# Patient Record
Sex: MALE | Race: WHITE | Employment: OTHER | URBAN - METROPOLITAN AREA
[De-identification: names, ages, dates, MRNs, and addresses within clinical notes are randomized per-mention and may not be internally consistent; named-entity substitution may affect disease eponyms.]

---

## 2022-05-18 ENCOUNTER — APPOINTMENT (OUTPATIENT)
Dept: GENERAL RADIOLOGY | Age: 52
End: 2022-05-18
Attending: EMERGENCY MEDICINE
Payer: MEDICARE

## 2022-05-18 ENCOUNTER — HOSPITAL ENCOUNTER (EMERGENCY)
Age: 52
Discharge: HOME | End: 2022-05-18
Attending: EMERGENCY MEDICINE
Payer: MEDICARE

## 2022-05-18 VITALS
SYSTOLIC BLOOD PRESSURE: 121 MMHG | OXYGEN SATURATION: 96 % | DIASTOLIC BLOOD PRESSURE: 70 MMHG | TEMPERATURE: 98.4 F | HEART RATE: 71 BPM | HEIGHT: 70 IN | RESPIRATION RATE: 16 BRPM | BODY MASS INDEX: 25.62 KG/M2 | WEIGHT: 179 LBS

## 2022-05-18 DIAGNOSIS — M25.562 ACUTE PAIN OF LEFT KNEE: Primary | ICD-10-CM

## 2022-05-18 PROCEDURE — 73562 X-RAY EXAM OF KNEE 3: CPT

## 2022-05-18 PROCEDURE — 74011250637 HC RX REV CODE- 250/637: Performed by: EMERGENCY MEDICINE

## 2022-05-18 PROCEDURE — 99283 EMERGENCY DEPT VISIT LOW MDM: CPT

## 2022-05-18 RX ORDER — IBUPROFEN 800 MG/1
800 TABLET ORAL
Status: COMPLETED | OUTPATIENT
Start: 2022-05-18 | End: 2022-05-18

## 2022-05-18 RX ADMIN — IBUPROFEN 800 MG: 800 TABLET, FILM COATED ORAL at 20:46

## 2022-05-18 NOTE — ED PROVIDER NOTES
58-year-old male with history of type 1 diabetes mellitus presents status post mechanical trip and fall yesterday while at work. States he landed on left knee. Reports mild pain to left knee. Denies significant swelling, color change. Denies history of remote arthritis, gout. Denies hitting head, loss conscious. Denies any anticoagulation. Rates pain as mild to moderate. Denies radiation of pain. Was attempting to take anything for pain prior to arrival.  Denies chest pain, shortness of breath, numbness, tingling, weakness. States he has been compliant with all of his home medications. States that sugars have been running in the 100s to low 200s. The history is provided by the patient. No  was used. Knee Injury   Pertinent negatives include no back pain and no neck pain. No past medical history on file. No past surgical history on file. No family history on file. Social History     Socioeconomic History    Marital status: LEGALLY      Spouse name: Not on file    Number of children: Not on file    Years of education: Not on file    Highest education level: Not on file   Occupational History    Not on file   Tobacco Use    Smoking status: Not on file    Smokeless tobacco: Not on file   Substance and Sexual Activity    Alcohol use: Not on file    Drug use: Not on file    Sexual activity: Not on file   Other Topics Concern    Not on file   Social History Narrative    Not on file     Social Determinants of Health     Financial Resource Strain:     Difficulty of Paying Living Expenses: Not on file   Food Insecurity:     Worried About Running Out of Food in the Last Year: Not on file    Juan of Food in the Last Year: Not on file   Transportation Needs:     Lack of Transportation (Medical): Not on file    Lack of Transportation (Non-Medical):  Not on file   Physical Activity:     Days of Exercise per Week: Not on file    Minutes of Exercise per Session: Not on file   Stress:     Feeling of Stress : Not on file   Social Connections:     Frequency of Communication with Friends and Family: Not on file    Frequency of Social Gatherings with Friends and Family: Not on file    Attends Hindu Services: Not on file    Active Member of Clubs or Organizations: Not on file    Attends Club or Organization Meetings: Not on file    Marital Status: Not on file   Intimate Partner Violence:     Fear of Current or Ex-Partner: Not on file    Emotionally Abused: Not on file    Physically Abused: Not on file    Sexually Abused: Not on file   Housing Stability:     Unable to Pay for Housing in the Last Year: Not on file    Number of [place] in the Last Year: Not on file    Unstable Housing in the Last Year: Not on file         ALLERGIES: Patient has no known allergies. Review of Systems   Constitutional: Negative for diaphoresis, fatigue and fever. HENT: Negative for congestion and rhinorrhea. Respiratory: Negative for cough and shortness of breath. Cardiovascular: Negative for chest pain and palpitations. Gastrointestinal: Negative for abdominal pain, nausea and vomiting. Musculoskeletal: Positive for arthralgias. Negative for back pain, neck pain and neck stiffness. Skin: Negative for color change and rash. Neurological: Negative for dizziness, syncope, weakness, light-headedness and headaches. Hematological: Does not bruise/bleed easily. Psychiatric/Behavioral: Negative for confusion. Vitals:    05/18/22 1917   BP: 136/67   Pulse: 74   Resp: 18   Temp: 98.2 °F (36.8 °C)   SpO2: 98%   Weight: 81.2 kg (179 lb)   Height: 5' 10\" (1.778 m)            Physical Exam  Vitals and nursing note reviewed. Constitutional:       Appearance: Normal appearance. HENT:      Head: Normocephalic.       Nose: Nose normal.      Mouth/Throat:      Mouth: Mucous membranes are moist.   Eyes:      Extraocular Movements: Extraocular movements intact. Pupils: Pupils are equal, round, and reactive to light. Cardiovascular:      Rate and Rhythm: Normal rate. Pulses: Normal pulses. Heart sounds: Normal heart sounds. Pulmonary:      Effort: Pulmonary effort is normal.      Breath sounds: Normal breath sounds. Abdominal:      General: Bowel sounds are normal.      Palpations: Abdomen is soft. Tenderness: There is no abdominal tenderness. There is no guarding or rebound. Musculoskeletal:         General: Tenderness present. Right knee: Normal. No swelling, deformity, effusion or erythema. Left knee: No swelling, deformity, effusion, erythema, lacerations, bony tenderness or crepitus. Tenderness present. Normal alignment. Right lower leg: No edema. Left lower leg: No edema. Comments: Mild tenderness noted to the left knee. No crepitus. No joint laxity. DP pulses 2+ and equal bilaterally. NVID. Cap refill <3 sec. Skin:     Findings: No erythema or rash. Neurological:      General: No focal deficit present. Mental Status: He is alert and oriented to person, place, and time. Cranial Nerves: No cranial nerve deficit. Sensory: No sensory deficit. Motor: No weakness. MDM  Number of Diagnoses or Management Options  Acute pain of left knee: new and requires workup  Diagnosis management comments: Vital signs stable. X-ray with no acute abnormality. Patient with no joint laxity. Ibuprofen, ice bag given to patient. Will place in knee immobilizer and have follow-up with primary care physician, orthopedic surgery.        Amount and/or Complexity of Data Reviewed  Tests in the radiology section of CPT®: ordered and reviewed  Review and summarize past medical records: yes  Independent visualization of images, tracings, or specimens: yes    Risk of Complications, Morbidity, and/or Mortality  Presenting problems: moderate  Diagnostic procedures: moderate  Management options: moderate    Patient Progress  Patient progress: stable         Procedures          XR KNEE LT 3 V   Final Result   1. No acute abnormality                     Nino Coleman MD; 5/18/2022 @7:44 PM Voice dictation software was used during the making of this note. This software is not perfect and grammatical and other typographical errors may be present.   This note has not been proofread for errors.  ===================================================================

## 2022-05-18 NOTE — ED TRIAGE NOTES
Arrives with face mask in place. Ambulatory with difficulty into triage. Reports left knee injury due to fall yesterday. Reportedly lost balance while working causing fall. Denies hitting head or loss of consciousness. Denies blood thinners. Denies attempting pain meds pta.

## 2022-05-19 NOTE — DISCHARGE INSTRUCTIONS
Rest, ice, elevate, compression bandage. NSAIDs for pain control. Schedule close follow-up with primary care physician, orthopedic surgery. Return if symptoms worsen or progress in any way.

## 2022-05-19 NOTE — ED NOTES
I have reviewed discharge instructions with the patient. The patient verbalized understanding. Patient left ED via Discharge Method: ambulatory to Home with friend. Opportunity for questions and clarification provided. Patient given 0 scripts. To continue your aftercare when you leave the hospital, you may receive an automated call from our care team to check in on how you are doing. This is a free service and part of our promise to provide the best care and service to meet your aftercare needs.  If you have questions, or wish to unsubscribe from this service please call [phone]. Thank you for Choosing our [facility] Emergency Department.

## 2022-06-15 ENCOUNTER — OFFICE VISIT (OUTPATIENT)
Dept: ORTHOPEDIC SURGERY | Age: 52
End: 2022-06-15
Payer: MEDICARE

## 2022-06-15 DIAGNOSIS — M17.12 PRIMARY OSTEOARTHRITIS OF LEFT KNEE: Primary | ICD-10-CM

## 2022-06-15 PROCEDURE — 4004F PT TOBACCO SCREEN RCVD TLK: CPT | Performed by: PHYSICIAN ASSISTANT

## 2022-06-15 PROCEDURE — 99202 OFFICE O/P NEW SF 15 MIN: CPT | Performed by: PHYSICIAN ASSISTANT

## 2022-06-15 PROCEDURE — G8419 CALC BMI OUT NRM PARAM NOF/U: HCPCS | Performed by: PHYSICIAN ASSISTANT

## 2022-06-15 PROCEDURE — G8428 CUR MEDS NOT DOCUMENT: HCPCS | Performed by: PHYSICIAN ASSISTANT

## 2022-06-15 PROCEDURE — 3017F COLORECTAL CA SCREEN DOC REV: CPT | Performed by: PHYSICIAN ASSISTANT

## 2022-06-15 PROCEDURE — 20610 DRAIN/INJ JOINT/BURSA W/O US: CPT | Performed by: PHYSICIAN ASSISTANT

## 2022-06-15 RX ORDER — METHYLPREDNISOLONE ACETATE 80 MG/ML
80 INJECTION, SUSPENSION INTRA-ARTICULAR; INTRALESIONAL; INTRAMUSCULAR; SOFT TISSUE ONCE
Status: COMPLETED | OUTPATIENT
Start: 2022-06-15 | End: 2022-06-15

## 2022-06-15 RX ADMIN — METHYLPREDNISOLONE ACETATE 80 MG: 80 INJECTION, SUSPENSION INTRA-ARTICULAR; INTRALESIONAL; INTRAMUSCULAR; SOFT TISSUE at 11:41

## 2022-06-15 NOTE — PROGRESS NOTES
[facility]          Patient ID:  Name: Migel Maria  AGE/Gender: 45 y.o. male  MRN: [id number]  : 1970    Date of Consultation:  Paola 15, 2022          ALLERGIES: Not on File         History:  The patient presents today as a new patient complaining primarily of   Left knee pain. This has been going on for a couple of months but has gradually gotten worse. He went to the ER last month and got x-rays. They placed him on motrin that helped some. They describe the pain as a general ache with periods of sharp pains. They have pain putting on his shoes and socks. They rate the pain as a 4 out of 10. The patient is limited in their activities because of this pain. They deny any injuries. They have no other complaints or concerns. Review of Systems:  A comprehensive review of systems was negative. Past Medical History Includes: No past medical history on file., No past surgical history on file. Family History: No family history on file. Social History:   Social History     Tobacco Use    Smoking status: Not on file    Smokeless tobacco: Not on file   Substance Use Topics    Alcohol use: Not on file         Physical Exam:     General:  On exam the patient is a pleasant 45 y.o. male in no acute distress, A&O x 3. Ambulates without an  antalgic gait. Back:   Knees:  No lower extremity redness, rashes or lymphadenopathy with either lower extremity. No effusion. ROM is 0-130 degrees on the Right and 0-130 degrees on the Left. No repitus noted on left Knee ROM. No instability to varus/valgus stress, no A/P instability on either extremity. Quad strength is good on both extremities. There is no extensor lag. Negative Luciano's test. Calves are soft and non-tender. Vascular: No distal edema or clubbing, Distal pulses are intact  Neurologic: Normal. Normal reflexes bilateral lower extremities.           Radiographs    X-rays reviewed on EMR that demonstrates moderate joint space narrowing the medial compartment with some eburnation of bone consistent with moderate osteoarthritis of the knee        Assessment:     Osteoarthritis of the  Left knee      Medical Decision Making and Plan:    X-rays were reviewed as well as referring provider notes  The patient has moderate arthritis of the left knee. This seems to be localized to the medial compartment. Offered him a steroid injection today which she agreed to. He is diabetic and will watch his blood sugars closely. If this helps we could try viscosupplementation as well. I also gave him samples of Pennsaid to try. We will follow-up in 4 weeks to see how he is doing or sooner if needed    Procedure: Depo-Medrol injection left knee  Procedure note: The left knee was sterilely prepped with Betadine and alcohol the left knee was then injected with 4 cc of 2% Xylocaine mixed with 80 mg of Depo-Medrol. The patient tolerated this without difficulty.   He will restrict activity for 48 hours and follow-up as scheduled or sooner if needed     Time spent in preparation, chart review, and direct patient care was 20 minutes    Electronically signed by:   LAURIE Lester, PA  6/15/2022,  11:37 AM

## 2022-07-13 ENCOUNTER — OFFICE VISIT (OUTPATIENT)
Dept: ORTHOPEDIC SURGERY | Age: 52
End: 2022-07-13
Payer: MEDICARE

## 2022-07-13 DIAGNOSIS — M17.12 PRIMARY OSTEOARTHRITIS OF LEFT KNEE: Primary | ICD-10-CM

## 2022-07-13 PROCEDURE — G8419 CALC BMI OUT NRM PARAM NOF/U: HCPCS | Performed by: PHYSICIAN ASSISTANT

## 2022-07-13 PROCEDURE — 4004F PT TOBACCO SCREEN RCVD TLK: CPT | Performed by: PHYSICIAN ASSISTANT

## 2022-07-13 PROCEDURE — G8428 CUR MEDS NOT DOCUMENT: HCPCS | Performed by: PHYSICIAN ASSISTANT

## 2022-07-13 PROCEDURE — 99212 OFFICE O/P EST SF 10 MIN: CPT | Performed by: PHYSICIAN ASSISTANT

## 2022-07-13 PROCEDURE — 3017F COLORECTAL CA SCREEN DOC REV: CPT | Performed by: PHYSICIAN ASSISTANT

## 2022-07-13 NOTE — LETTER
Joint Injection Precert Authorization Form    Name: Verna Pabon  : 1970  Age: 45 y.o. Gender: male    Clinical Information    Referring Doctor: Juanis Baker   Diagnosis:     ICD-10-CM    1. Primary osteoarthritis of left knee  M17.12    . Body Part: Left knee    Type of Service    [ ] Ford Mercer.Jose Ramon    [ ] Francesca Brandon.Salome    [ ] [facility] - Thuy.[place]    [ ] [facility]    [ X ] Darlyn Murray -       Comments      Insurance:  Active Insurance as of 2022     Patient has no active insurance coverage on file for 2022.          Radiologic evidence to support diagnosis: X-rays    Non-drug therapy: Lifestyle modifications    Pharmacologic Therapy: Steroid injection, anti-inflammatories

## 2022-07-19 ENCOUNTER — HOSPITAL ENCOUNTER (EMERGENCY)
Age: 52
Discharge: HOME | End: 2022-07-19
Attending: EMERGENCY MEDICINE
Payer: MEDICARE

## 2022-07-19 VITALS
HEIGHT: 70 IN | RESPIRATION RATE: 18 BRPM | HEART RATE: 81 BPM | DIASTOLIC BLOOD PRESSURE: 64 MMHG | TEMPERATURE: 99 F | BODY MASS INDEX: 25.62 KG/M2 | SYSTOLIC BLOOD PRESSURE: 99 MMHG | OXYGEN SATURATION: 96 % | WEIGHT: 179 LBS

## 2022-07-19 DIAGNOSIS — U07.1 COVID-19 VIRUS INFECTION: Primary | ICD-10-CM

## 2022-07-19 LAB
SARS-COV-2 RDRP RESP QL NAA+PROBE: DETECTED
SOURCE: ABNORMAL

## 2022-07-19 PROCEDURE — 99283 EMERGENCY DEPT VISIT LOW MDM: CPT

## 2022-07-19 PROCEDURE — 87635 SARS-COV-2 COVID-19 AMP PRB: CPT

## 2022-07-19 PROCEDURE — 6370000000 HC RX 637 (ALT 250 FOR IP): Performed by: EMERGENCY MEDICINE

## 2022-07-19 RX ORDER — PRAVASTATIN SODIUM 20 MG
20 TABLET ORAL DAILY
COMMUNITY
End: 2022-09-15

## 2022-07-19 RX ORDER — CITALOPRAM 10 MG/1
10 TABLET ORAL DAILY
COMMUNITY
End: 2022-09-15

## 2022-07-19 RX ORDER — ACETAMINOPHEN 500 MG
1000 TABLET ORAL
Status: COMPLETED | OUTPATIENT
Start: 2022-07-19 | End: 2022-07-19

## 2022-07-19 RX ADMIN — ACETAMINOPHEN 1000 MG: 500 TABLET, FILM COATED ORAL at 11:53

## 2022-07-19 ASSESSMENT — ENCOUNTER SYMPTOMS
GASTROINTESTINAL NEGATIVE: 1
COUGH: 1
RHINORRHEA: 1

## 2022-07-19 ASSESSMENT — PAIN - FUNCTIONAL ASSESSMENT: PAIN_FUNCTIONAL_ASSESSMENT: NONE - DENIES PAIN

## 2022-07-19 NOTE — ED PROVIDER NOTES
[facility] Emergency Department Provider Note                   PCP:                Apoorva Villavicencio MD               Age: 47 y.o. Sex: male       ICD-10-CM    1. COVID-19 virus infection  U07.1           DISPOSITION Decision To Discharge 07/19/2022 12:17:55 PM        MDM  Number of Diagnoses or Management Options  Diagnosis management comments: 48-year-old  male presented emergency department with complaints of fever and cough since yesterday. Patient tested positive for COVID-19 here. Patient is not hypoxic. Patient's vital signs are very reassuring. Patient clinically looks very good as well. Patient will be given prescription for outpatient COVID treatment. He will also take Motrin and Tylenol as needed for fevers. He will aggressively hydrate. He will return the emergency department for any concerns. Amount and/or Complexity of Data Reviewed  Clinical lab tests: reviewed    Patient Progress  Patient progress: stable       Orders Placed This Encounter   Procedures    [id number], Jey Saundersanika Cohen is a 47 y.o. male who presents to the Emergency Department with chief complaint of    Chief Complaint   Patient presents with    Cough    Fever      48-year-old  male presented to the emergency department with complaints of fever since yesterday. Patient states that he woke up this morning and he had developed a cough and sneezing. He states that he feels pretty good right now but wanted to know what was going on. Patient has not vaccinated for COVID-19. He has no known exposure to COVID-19. Patient is not febrile currently. He denies any headache, chest pain, shortness of breath, nausea, vomiting, diarrhea or any other concerns. The history is provided by the patient. Review of Systems   Constitutional:  Positive for fatigue and fever. HENT:  Positive for rhinorrhea and sneezing. Respiratory:  Positive for cough. Cardiovascular: Negative. Gastrointestinal: Negative. Genitourinary: Negative. Musculoskeletal: Negative. Skin: Negative. Neurological: Negative. Psychiatric/Behavioral: Negative. All other systems reviewed and are negative. Past Medical History:   Diagnosis Date    High cholesterol         History reviewed. No pertinent surgical history. History reviewed. No pertinent family history. Social History     Socioeconomic History    Marital status: Legally      Spouse name: None    Number of children: None    Years of education: None    Highest education level: None   Tobacco Use    Smoking status: Never    Smokeless tobacco: Never   Substance and Sexual Activity    Alcohol use: Never         Patient has no known allergies. Previous Medications    CITALOPRAM (CELEXA) 10 MG TABLET    Take 10 mg by mouth in the morning. PRAVASTATIN (PRAVACHOL) 20 MG TABLET    Take 20 mg by mouth in the morning. Vitals signs and nursing note reviewed. Patient Vitals for the past 4 hrs:   Temp Pulse Resp BP SpO2   07/19/22 1030 99 °F (37.2 °C) 81 18 111/64 98 %          Physical Exam  Vitals and nursing note reviewed. Constitutional:       General: He is not in acute distress. Appearance: Normal appearance. He is not ill-appearing or toxic-appearing. HENT:      Head: Normocephalic and atraumatic. Mouth/Throat:      Mouth: Mucous membranes are moist.   Eyes:      Extraocular Movements: Extraocular movements intact. Pupils: Pupils are equal, round, and reactive to light. Cardiovascular:      Rate and Rhythm: Normal rate and regular rhythm. Pulses: Normal pulses. Heart sounds: Normal heart sounds. Pulmonary:      Effort: Pulmonary effort is normal.      Breath sounds: Normal breath sounds. Abdominal:      Palpations: Abdomen is soft. Tenderness: There is no abdominal tenderness. There is no guarding. Musculoskeletal:         General: Normal range of motion.    Skin: General: Skin is warm and dry. Neurological:      General: No focal deficit present. Mental Status: He is alert and oriented to person, place, and time. Psychiatric:         Mood and Affect: Mood normal.         Behavior: Behavior normal.         Thought Content: Thought content normal.         Judgment: Judgment normal.              Labs Reviewed   COVID-19, RAPID - Abnormal; Notable for the following components:       Result Value    SARS-CoV-2, Rapid Detected (*)     All other components within normal limits        No orders to display                          Voice dictation software was used during the making of this note. This software is not perfect and grammatical and other typographical errors may be present. This note has not been completely proofread for errors.        Minh Carnes MD  07/19/22 2011

## 2022-07-19 NOTE — DISCHARGE INSTRUCTIONS
Take Motrin Tylenol as needed for body aches and fevers. Return to the emergency department for any concerns. Aggressively hydrate.

## 2022-07-19 NOTE — ED NOTES
I have reviewed discharge instructions with the patient. The patient verbalized understanding. Patient left ED via Discharge Method: ambulatory to Home with self. Opportunity for questions and clarification provided. Patient given 0 scripts. To continue your aftercare when you leave the hospital, you may receive an automated call from our care team to check in on how you are doing. This is a free service and part of our promise to provide the best care and service to meet your aftercare needs.  If you have questions, or wish to unsubscribe from this service please call [phone]. Thank you for Choosing our [facility] Emergency Department.         Fidelina Perkins RN  07/19/22 0887

## 2022-07-19 NOTE — Clinical Note
Yony Haas was seen and treated in our emergency department on 7/19/2022. COVID19 virus is suspected. Per the CDC guidelines we recommend home isolation until the following conditions are all met:    1. At least five days have passed since symptoms first appeared and/or had a close exposure,   2. After home isolation for five days, wearing a mask around others for the next five days,  3. At least 24 have passed since last fever without the use of fever-reducing medications and  4. Symptoms (eg cough, shortness of breath) have improved    If you have any questions or concerns, please don't hesitate to call.     He may return to work/school on 07/25/2022        Minna Page MD

## 2022-07-19 NOTE — ED TRIAGE NOTES
Pt states he has had cough since yesterday, had fever of 101.3 today, has not taken tylenol or motrin. Denies coughing anything up. Denies SOB nausea or diarrhea.

## 2022-07-20 ENCOUNTER — CARE COORDINATION (OUTPATIENT)
Dept: CARE COORDINATION | Facility: CLINIC | Age: 52
End: 2022-07-20

## 2022-07-20 NOTE — CARE COORDINATION
Date/Time:  7/20/2022 9:17 AM  Attempted to reach patient by telephone for COVID REBECCA. Call within 2 business days of discharge: Yes Left HIPPA compliant message requesting a return call. Will attempt to reach patient again.

## 2022-07-21 ENCOUNTER — CARE COORDINATION (OUTPATIENT)
Dept: CARE COORDINATION | Facility: CLINIC | Age: 52
End: 2022-07-21

## 2022-07-21 NOTE — CARE COORDINATION
Date/Time:  7/21/2022 9:11 AM  2nd attempt to reach patient by telephone for COVID REBECCA. Initial call within 2 business days of discharge: Yes Left another HIPPA compliant message requesting a return call. Episode will be resolved at this time due to no return call. Episode will resume if return call is received.

## 2022-07-27 ENCOUNTER — OFFICE VISIT (OUTPATIENT)
Dept: ORTHOPEDIC SURGERY | Age: 52
End: 2022-07-27
Payer: MEDICARE

## 2022-07-27 DIAGNOSIS — M17.12 PRIMARY OSTEOARTHRITIS OF LEFT KNEE: Primary | ICD-10-CM

## 2022-07-27 PROCEDURE — 20610 DRAIN/INJ JOINT/BURSA W/O US: CPT | Performed by: PHYSICIAN ASSISTANT

## 2022-07-27 RX ORDER — INSULIN DEGLUDEC INJECTION 100 U/ML
34 INJECTION, SOLUTION SUBCUTANEOUS EVERY MORNING
COMMUNITY
Start: 2022-07-13 | End: 2022-09-15

## 2022-07-27 RX ORDER — CEPHALEXIN 500 MG/1
CAPSULE ORAL
COMMUNITY
Start: 2022-05-09 | End: 2022-09-15

## 2022-07-27 RX ORDER — BROMPHENIRAMINE MALEATE, PSEUDOEPHEDRINE HYDROCHLORIDE, AND DEXTROMETHORPHAN HYDROBROMIDE 2; 30; 10 MG/5ML; MG/5ML; MG/5ML
5 SYRUP ORAL EVERY 4 HOURS PRN
COMMUNITY
Start: 2022-01-20 | End: 2022-09-15

## 2022-07-27 RX ORDER — ONDANSETRON 8 MG/1
8 TABLET, ORALLY DISINTEGRATING ORAL 3 TIMES DAILY PRN
COMMUNITY
Start: 2022-04-12 | End: 2022-09-15

## 2022-07-27 RX ORDER — PRAVASTATIN SODIUM 20 MG
20 TABLET ORAL DAILY
COMMUNITY
Start: 2022-06-03

## 2022-07-27 RX ORDER — ACETAMINOPHEN AND CODEINE PHOSPHATE 300; 30 MG/1; MG/1
1 TABLET ORAL EVERY 4 HOURS PRN
COMMUNITY
Start: 2021-07-27 | End: 2022-09-15

## 2022-07-27 RX ORDER — INSULIN LISPRO 100 [IU]/ML
INJECTION, SOLUTION INTRAVENOUS; SUBCUTANEOUS
COMMUNITY
Start: 2021-12-20

## 2022-07-27 RX ORDER — LISINOPRIL 5 MG/1
5 TABLET ORAL DAILY
COMMUNITY
Start: 2022-06-03 | End: 2023-06-03

## 2022-07-27 RX ORDER — CITALOPRAM 10 MG/1
10 TABLET ORAL DAILY
COMMUNITY
Start: 2022-06-03 | End: 2023-06-03

## 2022-07-27 RX ORDER — INSULIN DEGLUDEC INJECTION 100 U/ML
INJECTION, SOLUTION SUBCUTANEOUS
COMMUNITY
Start: 2022-07-13

## 2022-07-27 RX ORDER — PSEUDOEPHEDRINE HCL 30 MG
100 TABLET ORAL
COMMUNITY
Start: 2021-08-05 | End: 2022-09-15

## 2022-07-27 RX ORDER — ALBUTEROL SULFATE 90 UG/1
2 AEROSOL, METERED RESPIRATORY (INHALATION) EVERY 6 HOURS PRN
COMMUNITY
Start: 2022-01-04 | End: 2022-09-15

## 2022-07-27 NOTE — PROGRESS NOTES
[address]          Patient ID:  Name: Eleazar Liang  AGE/Gender: 47 y.o. male  MRN: [id number]  : 1970    Date of Consultation:  2022        ALLERGIES: No Known Allergies     Diagnosis: Osteoarthritis left Knee    PROCEDURE:  4ml of Monovisc  Injection of the left Knee        The procedure was explained to the patient and possible adverse reactions were discussed. The  left knee/s anteriolateral aspect was prepped and cleansed with: sterile fashion with betadine and alcohol.  The left Knee was then injected with 4ml of Monovisc     How tolerated by patient: tolerated the procedure well with no complications    Post injection instructions were given to Eleazar Liang:       Electronically signed by:   LAURIE Fraser Si, PA  2022,  11:51 AM

## 2022-09-15 ENCOUNTER — OFFICE VISIT (OUTPATIENT)
Dept: INTERNAL MEDICINE CLINIC | Facility: CLINIC | Age: 52
End: 2022-09-15
Payer: MEDICARE

## 2022-09-15 VITALS
SYSTOLIC BLOOD PRESSURE: 98 MMHG | OXYGEN SATURATION: 96 % | HEIGHT: 70 IN | RESPIRATION RATE: 18 BRPM | WEIGHT: 169 LBS | BODY MASS INDEX: 24.2 KG/M2 | HEART RATE: 65 BPM | DIASTOLIC BLOOD PRESSURE: 62 MMHG

## 2022-09-15 DIAGNOSIS — J30.2 SEASONAL ALLERGIES: ICD-10-CM

## 2022-09-15 DIAGNOSIS — F32.89 OTHER DEPRESSION: ICD-10-CM

## 2022-09-15 DIAGNOSIS — I10 PRIMARY HYPERTENSION: Primary | ICD-10-CM

## 2022-09-15 DIAGNOSIS — E78.2 MIXED HYPERLIPIDEMIA: ICD-10-CM

## 2022-09-15 DIAGNOSIS — E10.69 TYPE 1 DIABETES MELLITUS WITH OTHER SPECIFIED COMPLICATION (HCC): ICD-10-CM

## 2022-09-15 DIAGNOSIS — F81.9 DELAY OF COGNITIVE DEVELOPMENT: ICD-10-CM

## 2022-09-15 PROBLEM — E11.9 DIABETES (HCC): Status: ACTIVE | Noted: 2022-09-15

## 2022-09-15 PROBLEM — E78.00 HIGH CHOLESTEROL: Status: ACTIVE | Noted: 2022-09-15

## 2022-09-15 PROBLEM — F41.9 ANXIETY: Status: ACTIVE | Noted: 2022-09-15

## 2022-09-15 PROBLEM — F32.A DEPRESSION: Status: ACTIVE | Noted: 2022-09-15

## 2022-09-15 PROCEDURE — G8420 CALC BMI NORM PARAMETERS: HCPCS | Performed by: INTERNAL MEDICINE

## 2022-09-15 PROCEDURE — 3017F COLORECTAL CA SCREEN DOC REV: CPT | Performed by: INTERNAL MEDICINE

## 2022-09-15 PROCEDURE — 1036F TOBACCO NON-USER: CPT | Performed by: INTERNAL MEDICINE

## 2022-09-15 PROCEDURE — G8427 DOCREV CUR MEDS BY ELIG CLIN: HCPCS | Performed by: INTERNAL MEDICINE

## 2022-09-15 PROCEDURE — 99204 OFFICE O/P NEW MOD 45 MIN: CPT | Performed by: INTERNAL MEDICINE

## 2022-09-15 PROCEDURE — 3046F HEMOGLOBIN A1C LEVEL >9.0%: CPT | Performed by: INTERNAL MEDICINE

## 2022-09-15 PROCEDURE — 2022F DILAT RTA XM EVC RTNOPTHY: CPT | Performed by: INTERNAL MEDICINE

## 2022-09-15 RX ORDER — CETIRIZINE HYDROCHLORIDE 10 MG/1
10 TABLET ORAL DAILY
Qty: 90 TABLET | Refills: 1 | Status: SHIPPED | OUTPATIENT
Start: 2022-09-15

## 2022-09-15 RX ORDER — BLOOD-GLUCOSE SENSOR
EACH MISCELLANEOUS
COMMUNITY
Start: 2022-08-17

## 2022-09-15 RX ORDER — CETIRIZINE HYDROCHLORIDE 10 MG/1
10 TABLET ORAL DAILY
COMMUNITY
End: 2022-09-15 | Stop reason: SDUPTHER

## 2022-09-15 ASSESSMENT — ANXIETY QUESTIONNAIRES
1. FEELING NERVOUS, ANXIOUS, OR ON EDGE: 0
GAD7 TOTAL SCORE: 0
2. NOT BEING ABLE TO STOP OR CONTROL WORRYING: 0
6. BECOMING EASILY ANNOYED OR IRRITABLE: 0
3. WORRYING TOO MUCH ABOUT DIFFERENT THINGS: 0
4. TROUBLE RELAXING: 0
7. FEELING AFRAID AS IF SOMETHING AWFUL MIGHT HAPPEN: 0
5. BEING SO RESTLESS THAT IT IS HARD TO SIT STILL: 0

## 2022-09-15 ASSESSMENT — ENCOUNTER SYMPTOMS
CHEST TIGHTNESS: 0
ABDOMINAL DISTENTION: 0
SHORTNESS OF BREATH: 0
WHEEZING: 0
PHOTOPHOBIA: 0

## 2022-09-15 ASSESSMENT — PATIENT HEALTH QUESTIONNAIRE - PHQ9
SUM OF ALL RESPONSES TO PHQ QUESTIONS 1-9: 0
SUM OF ALL RESPONSES TO PHQ9 QUESTIONS 1 & 2: 0
2. FEELING DOWN, DEPRESSED OR HOPELESS: 0
SUM OF ALL RESPONSES TO PHQ QUESTIONS 1-9: 0
SUM OF ALL RESPONSES TO PHQ QUESTIONS 1-9: 0
1. LITTLE INTEREST OR PLEASURE IN DOING THINGS: 0
SUM OF ALL RESPONSES TO PHQ QUESTIONS 1-9: 0

## 2022-09-15 NOTE — ASSESSMENT & PLAN NOTE
Well-controlled, continue current medications, continue current treatment plan, medication adherence emphasized and lifestyle modifications recommended   Key Anti-Hypertensive Meds          lisinopril (PRINIVIL;ZESTRIL) 5 MG tablet (Taking)    Class: Historical Med

## 2022-09-15 NOTE — ASSESSMENT & PLAN NOTE
Monitored by specialist- no acute findings meriting change in the plan   Suboptimal control, he is following with endocrine in [facility], his last hemoglobin A1c was above 9, recommended adjustment of his insulin.

## 2022-09-15 NOTE — PROGRESS NOTES
Chief Complaint   Patient presents with    [facility]        Panchito Sal is a 47 y.o. male who presents today for [facility]     He is here to establish care, Ulisses Olguin is following with endocrine for diabetes mellitus type 1 since age of 14, he is currently using [place], and Humalog, currently up-to-date with his labs  He has a developmental delay, for which he has been on Medicaid, he lives with his brother, does not drive, he needs assistance for trips to his primary care's, and specialist visits,    He wants to make an change in primary care as this is a more convenient location, and feels it was getting harder to get his refills. He has a history of hypertension, he reports a no side effects of the medications he is currently taking, had history of hyperlipidemia on statins, his labs were up-to-date. He also has history of depression, reports has been taking his medications since 2013, he feels the medication has been helping with symptoms, he denies any worsening of anxiety, or depression, and he would like to continue the medication at the current dose. Wt Readings from Last 3 Encounters:   09/15/22 169 lb (76.7 kg)   07/19/22 179 lb (81.2 kg)     Vitals:    09/15/22 1059   BP: 98/62   Site: Left Upper Arm   Position: Sitting   Pulse: 65   Resp: 18   SpO2: 96%   Weight: 169 lb (76.7 kg)   Height: 5' 10\" (1.778 m)        Assessment and plan:  1.  Primary hypertension  Assessment & Plan:   Well-controlled, continue current medications, continue current treatment plan, medication adherence emphasized and lifestyle modifications recommended   Key Anti-Hypertensive Meds            lisinopril (PRINIVIL;ZESTRIL) 5 MG tablet (Taking)    Class: Historical Med            2. Type 1 diabetes mellitus with other specified complication [facility])  Assessment & Plan:   Monitored by specialist- no acute findings meriting change in the plan   Suboptimal control, he is following with endocrine in [facility], his last hemoglobin A1c was above 9, recommended adjustment of his insulin. 3. Other depression  Assessment & Plan:   Well-controlled, continue current medications, medication adherence emphasized and lifestyle modifications recommended  4. Seasonal allergies  -     cetirizine (ZYRTEC) 10 MG tablet; Take 1 tablet by mouth daily, Disp-90 tablet, R-1Normal  5. Delay of cognitive development  6. Mixed hyperlipidemia  Assessment & Plan:   Well-controlled, continue current medications, medication adherence emphasized and lifestyle modifications recommended   Key CAD CHF Meds            pravastatin (PRAVACHOL) 20 MG tablet (Taking)    Class: Historical Med    lisinopril (PRINIVIL;ZESTRIL) 5 MG tablet (Taking)    Class: Historical Med              Return in about 4 months (around 1/15/2023) for HTN, hyperlipidemia, diabetes. Review of system:    Review of Systems   Constitutional:  Negative for activity change, fatigue and unexpected weight change. Eyes:  Negative for photophobia and visual disturbance. Respiratory:  Negative for chest tightness, shortness of breath and wheezing. Cardiovascular:  Negative for chest pain, palpitations and leg swelling. Gastrointestinal:  Negative for abdominal distention. Genitourinary:  Negative for difficulty urinating and frequency. Musculoskeletal:  Positive for arthralgias (currently seeing ortho for Dupuytren contracture). Negative for myalgias. Neurological:  Negative for dizziness and headaches. Psychiatric/Behavioral:  Behavioral problem: congnitive delay.         Immunization history:    Immunization History   Administered Date(s) Administered    COVID-19, J&J, (age 18y+), IM, 0.5 mL 08/04/2021, 12/28/2021    Influenza Virus Vaccine 10/19/2018    Influenza, FLUBLOK, (age 25 y+), PF, 0.5mL 11/08/2019, 11/03/2020, 12/22/2021    Influenza, Triv, 3 Years and older, IM (Afluria (5 yrs and older) 10/12/2012    Pneumococcal Conjugate 13-valent recognition software. . Please be aware that there may be inadvertent transcription errors not identified and corrected by the [employer]

## 2022-09-15 NOTE — ASSESSMENT & PLAN NOTE
Well-controlled, continue current medications, medication adherence emphasized and lifestyle modifications recommended   Key CAD CHF Meds          pravastatin (PRAVACHOL) 20 MG tablet (Taking)    Class: Historical Med    lisinopril (PRINIVIL;ZESTRIL) 5 MG tablet (Taking)    Class: Historical Med

## 2022-11-02 ENCOUNTER — TELEPHONE (OUTPATIENT)
Dept: ORTHOPEDIC SURGERY | Age: 52
End: 2022-11-02

## 2022-11-02 ENCOUNTER — OFFICE VISIT (OUTPATIENT)
Dept: ORTHOPEDIC SURGERY | Age: 52
End: 2022-11-02
Payer: MEDICARE

## 2022-11-02 DIAGNOSIS — M72.0 DUPUYTREN'S CONTRACTURE OF RIGHT HAND: Primary | ICD-10-CM

## 2022-11-02 DIAGNOSIS — M72.0 DUPUYTREN'S CONTRACTURE OF LEFT HAND: ICD-10-CM

## 2022-11-02 PROCEDURE — G8428 CUR MEDS NOT DOCUMENT: HCPCS | Performed by: ORTHOPAEDIC SURGERY

## 2022-11-02 PROCEDURE — G8420 CALC BMI NORM PARAMETERS: HCPCS | Performed by: ORTHOPAEDIC SURGERY

## 2022-11-02 PROCEDURE — G8484 FLU IMMUNIZE NO ADMIN: HCPCS | Performed by: ORTHOPAEDIC SURGERY

## 2022-11-02 PROCEDURE — 3017F COLORECTAL CA SCREEN DOC REV: CPT | Performed by: ORTHOPAEDIC SURGERY

## 2022-11-02 PROCEDURE — 1036F TOBACCO NON-USER: CPT | Performed by: ORTHOPAEDIC SURGERY

## 2022-11-02 PROCEDURE — 99204 OFFICE O/P NEW MOD 45 MIN: CPT | Performed by: ORTHOPAEDIC SURGERY

## 2022-11-02 NOTE — PROGRESS NOTES
Orthopaedic Hand Clinic Note    Name: Fermin Culp  YOB: 1970  Gender: male  MRN: [id number]      CC: Patient referred for evaluation of upper extremity pain    HPI: Fermin Culp is a 47 y.o. male Right hand dominant with a chief complaint of bilateral hand Dupuytren contracture, symptoms have been progressing for over a year, the right ring finger is causing severe impairment of his everyday life because he gets in the way of everything. ROS/Meds/PSH/PMH/FH/SH: I personally reviewed the patients standard intake form. Pertinents are discussed in the HPI    Physical Examination:  General: Awake and alert. HEENT: Normocephalic, atraumatic  CV/Pulm: Breathing even and unlabored  Skin: No obvious rashes noted. Lymphatic: No obvious evidence of lymphedema or lymphadenopathy    Musculoskeletal Exam:  Examination on the right upper extremity demonstrates cap refill < 5 seconds in all fingers, Dupuytren cords along the ring and small finger causing 0 degree contracture at the small finger MCP joint, 25 degree contracture of the small finger PIP joint, 25 degree contracture at the ring finger MCP joint, 90 degree contracture at the ring finger PIP joint, he has knuckle pads on the right small, ring, middle and index finger PIP joints dorsally. Palpable Dupuytren cord on the left hand along the small finger causing 20 degree contracture at the PIP joint, no contracture at the MCP joint, he has knuckle pads of the left hand along the index, middle and ring finger PIP joints dorsally. Imaging / Electrodiagnostic Tests:     none    Assessment:   1. Dupuytren's contracture of right hand    2. Dupuytren's contracture of left hand        Plan:   We discussed the diagnosis and different treatment options. We discussed observation, therapy, antiinflammatory medications and other pertinent treatment modalities.     After discussing in detail the patient elects to proceed with [facility] injection and manipulation, we discussed all treatment options in detail including surgical excision of the fascia, observation and Xiaflex injections, patient would like to proceed with Xiaflex injection and manipulation due to the less invasive nature of the procedure, he understands that there is a higher recurrence rate with Xiaflex injection then with surgery but either way the recurrence rate is not 0 with either procedure. Patient voiced accordance and understanding of the information provided and the formulated plan. All questions were answered to the patient's satisfaction during the encounter.     Theodore Gentile MD  Orthopaedic Surgery  11/02/22  11:07 AM

## 2022-11-14 ENCOUNTER — HOSPITAL ENCOUNTER (EMERGENCY)
Age: 52
Discharge: HOME | End: 2022-11-14
Attending: STUDENT IN AN ORGANIZED HEALTH CARE EDUCATION/TRAINING PROGRAM | Admitting: STUDENT IN AN ORGANIZED HEALTH CARE EDUCATION/TRAINING PROGRAM
Payer: MEDICARE

## 2022-11-14 VITALS
RESPIRATION RATE: 18 BRPM | SYSTOLIC BLOOD PRESSURE: 99 MMHG | DIASTOLIC BLOOD PRESSURE: 70 MMHG | OXYGEN SATURATION: 98 % | TEMPERATURE: 98.4 F | HEART RATE: 70 BPM | HEIGHT: 70 IN | BODY MASS INDEX: 25.05 KG/M2 | WEIGHT: 175 LBS

## 2022-11-14 DIAGNOSIS — J06.9 VIRAL URI WITH COUGH: Primary | ICD-10-CM

## 2022-11-14 LAB
FLUAV AG NPH QL IA: NEGATIVE
FLUBV AG NPH QL IA: NEGATIVE
SARS-COV-2 RDRP RESP QL NAA+PROBE: NOT DETECTED
SOURCE: NORMAL
SPECIMEN SOURCE: NORMAL

## 2022-11-14 PROCEDURE — 87635 SARS-COV-2 COVID-19 AMP PRB: CPT

## 2022-11-14 PROCEDURE — 87804 INFLUENZA ASSAY W/OPTIC: CPT

## 2022-11-14 PROCEDURE — 99283 EMERGENCY DEPT VISIT LOW MDM: CPT | Performed by: STUDENT IN AN ORGANIZED HEALTH CARE EDUCATION/TRAINING PROGRAM

## 2022-11-14 RX ORDER — BENZONATATE 100 MG/1
100 CAPSULE ORAL 2 TIMES DAILY PRN
Qty: 14 CAPSULE | Refills: 0 | Status: SHIPPED | OUTPATIENT
Start: 2022-11-14 | End: 2022-11-21

## 2022-11-14 ASSESSMENT — ENCOUNTER SYMPTOMS
SINUS PAIN: 0
VOMITING: 0
NAUSEA: 0
SHORTNESS OF BREATH: 0
DIARRHEA: 0
BACK PAIN: 0
COUGH: 1
PHOTOPHOBIA: 0

## 2022-11-14 ASSESSMENT — PAIN - FUNCTIONAL ASSESSMENT: PAIN_FUNCTIONAL_ASSESSMENT: NONE - DENIES PAIN

## 2022-11-15 NOTE — ED TRIAGE NOTES
Pt c/o cough, runny nose and sore throat. Denies SOB, denies headache, denies N&V, denies fever, denies chills and fatigue.

## 2022-11-15 NOTE — DISCHARGE INSTRUCTIONS
Alternate Tylenol and Motrin as needed for body aches as needed. Continue taking over-the-counter cough and cold medications. Follow-up with primary care physician as needed. Use Tessalon Perles for cough. Return to the ER for worsening or worrisome symptoms.

## 2022-11-15 NOTE — ED PROVIDER NOTES
Emergency Department Provider Note                   PCP: Carolyn Bhakta MD               Age: 47 y.o. Sex: male       ICD-10-CM    1. Viral URI with cough  J06.9           DISPOSITION Decision To Discharge 11/14/2022 09:21:39 PM        MDM  Number of Diagnoses or Management Options  Viral URI with cough  Diagnosis management comments: Well-appearing 67-year-old male presents to the emerged part with URI symptoms. Also with a dry cough. COVID and flu are negative. Will give Tessalon Perles for symptomatic management. He will alternate Tylenol Motrin for fever if he develops one. Advised to see his family physician within 1 week. Return to the ER for worsening or worrisome symptoms. He voiced understanding and agreement with this plan. Amount and/or Complexity of Data Reviewed  Clinical lab tests: ordered and reviewed    Risk of Complications, Morbidity, and/or Mortality  Presenting problems: low  Diagnostic procedures: low  Management options: low               Orders Placed This Encounter   Procedures    Rapid influenza A/B antigens    COVID-19, Rapid        Medications - No data to display    New Prescriptions    BENZONATATE (TESSALON) 100 MG CAPSULE    Take 1 capsule by mouth 2 times daily as needed for Cough        Carlos Lee is a 47 y.o. male who presents to the Emergency Department with chief complaint of    Chief Complaint   Patient presents with    Cough      67-year-old male presents to the emergency department with URI symptoms for the last 1 to 2 days. Has been using over-the-counter medications for symptom treatment. Denies fever or chills peer denies sore throat. States has a dry cough. Cough is worse at nighttime. Denies nausea, vomiting or diarrhea. Has been taking his allergy medication with no improvement of symptoms. Review of Systems   Constitutional:  Negative for chills and fever. HENT:  Negative for congestion and sinus pain. Eyes:  Negative for photophobia. Respiratory:  Positive for cough. Negative for shortness of breath. Cardiovascular:  Negative for chest pain. Gastrointestinal:  Negative for diarrhea, nausea and vomiting. Genitourinary:  Negative for difficulty urinating. Musculoskeletal:  Negative for back pain. Skin:  Negative for rash. Neurological:  Negative for syncope and headaches. Psychiatric/Behavioral:  Negative for confusion. All other systems reviewed and are negative. Past Medical History:   Diagnosis Date    Anxiety     Depression     Diabetes ([address]     High cholesterol     Hypertension         Past Surgical History:   Procedure Laterality Date    APPENDECTOMY          History reviewed. No pertinent family history. Social History     Socioeconomic History    Marital status: Legally      Spouse name: None    Number of children: None    Years of education: None    Highest education level: None   Tobacco Use    Smoking status: Never    Smokeless tobacco: Never   Vaping Use    Vaping Use: Never used   Substance and Sexual Activity    Alcohol use: Never    Drug use: Never         Patient has no known allergies. Previous Medications    CETIRIZINE (ZYRTEC) 10 MG TABLET    Take 1 tablet by mouth daily    CITALOPRAM (CELEXA) 10 MG TABLET    Take 10 mg by mouth in the morning. CONTINUOUS BLOOD GLUC SENSOR (DEXCOM G6 SENSOR) MISC    Change sensor every 10 days    INSULIN LISPRO, 1 UNIT DIAL, 100 UNIT/ML SOPN    Inject 8 units breakfast, 6 units lunch and 7 units supper    LISINOPRIL (PRINIVIL;ZESTRIL) 5 MG TABLET    Take 5 mg by mouth in the morning. PRAVASTATIN (PRAVACHOL) 20 MG TABLET    Take 20 mg by mouth in the morning. TRESIBA FLEXTOUCH 100 UNIT/ML SOPN    ADMINISTER 34 UNITS UNDER THE SKIN EVERY MORNING        Vitals signs and nursing note reviewed.    Patient Vitals for the past 4 hrs:   Temp Pulse Resp BP SpO2   11/14/22 2034 98.4 °F (36.9 °C) 70 18 99/70 98 % Physical Exam  Vitals and nursing note reviewed. Constitutional:       General: He is not in acute distress. Appearance: Normal appearance. HENT:      Head: Normocephalic. Nose: Nose normal.      Mouth/Throat:      Mouth: Mucous membranes are moist.   Eyes:      Extraocular Movements: Extraocular movements intact. Cardiovascular:      Rate and Rhythm: Normal rate and regular rhythm. Pulses: Normal pulses. Heart sounds: Normal heart sounds. Pulmonary:      Effort: Pulmonary effort is normal. No respiratory distress. Breath sounds: Normal breath sounds. Abdominal:      General: Abdomen is flat. Musculoskeletal:         General: No swelling. Normal range of motion. Cervical back: Normal range of motion. No rigidity. Skin:     General: Skin is warm. Capillary Refill: Capillary refill takes less than 2 seconds. Findings: No rash. Neurological:      General: No focal deficit present. Mental Status: He is alert and oriented to person, place, and time. Psychiatric:         Mood and Affect: Mood normal.        Procedures    Results for orders placed or performed during the hospital encounter of 11/14/22   Rapid influenza A/B antigens    Specimen: Nasal Washing   Result Value Ref Range    Influenza A Ag Negative NEG      Influenza B Ag Negative NEG      Source Nasopharyngeal     COVID-19, Rapid    Specimen: Nasopharyngeal   Result Value Ref Range    Source Nasopharyngeal      SARS-CoV-2, Rapid Not detected NOTD          No orders to display                       Voice dictation software was used during the making of this note. This software is not perfect and grammatical and other typographical errors may be present. This note has not been completely proofread for errors.         Edin Koenig DO  11/14/22 [id number]

## 2022-11-15 NOTE — ED NOTES
I have reviewed discharge instructions with the patient. The patient verbalized understanding. Patient left ED via Discharge Method: ambulatory to Home with self. Opportunity for questions and clarification provided. Patient given 1 scripts. To continue your aftercare when you leave the hospital, you may receive an automated call from our care team to check in on how you are doing. This is a free service and part of our promise to provide the best care and service to meet your aftercare needs.  If you have questions, or wish to unsubscribe from this service please call [phone]. Thank you for Choosing our [facility] Emergency Department.         Matthew Lawson RN  11/14/22 8619

## 2022-12-01 ENCOUNTER — TELEPHONE (OUTPATIENT)
Dept: ORTHOPEDIC SURGERY | Age: 52
End: 2022-12-01

## 2022-12-01 NOTE — TELEPHONE ENCOUNTER
Called pt in regards to xiaflex and pt stateshe wants to cancel for right now and will call back in the future if he wants to go forward and restart the process again.

## 2023-01-16 ENCOUNTER — OFFICE VISIT (OUTPATIENT)
Dept: INTERNAL MEDICINE CLINIC | Facility: CLINIC | Age: 53
End: 2023-01-16
Payer: MEDICARE

## 2023-01-16 VITALS
OXYGEN SATURATION: 92 % | DIASTOLIC BLOOD PRESSURE: 54 MMHG | HEIGHT: 70 IN | HEART RATE: 71 BPM | WEIGHT: 170.8 LBS | SYSTOLIC BLOOD PRESSURE: 98 MMHG | BODY MASS INDEX: 24.45 KG/M2

## 2023-01-16 DIAGNOSIS — J30.2 SEASONAL ALLERGIES: ICD-10-CM

## 2023-01-16 DIAGNOSIS — E10.69 TYPE 1 DIABETES MELLITUS WITH OTHER SPECIFIED COMPLICATION (HCC): ICD-10-CM

## 2023-01-16 DIAGNOSIS — E78.2 MIXED HYPERLIPIDEMIA: ICD-10-CM

## 2023-01-16 DIAGNOSIS — F41.9 ANXIETY: ICD-10-CM

## 2023-01-16 DIAGNOSIS — Z12.11 SCREEN FOR COLON CANCER: ICD-10-CM

## 2023-01-16 DIAGNOSIS — I10 PRIMARY HYPERTENSION: Primary | ICD-10-CM

## 2023-01-16 DIAGNOSIS — F32.89 OTHER DEPRESSION: ICD-10-CM

## 2023-01-16 PROCEDURE — G8427 DOCREV CUR MEDS BY ELIG CLIN: HCPCS | Performed by: INTERNAL MEDICINE

## 2023-01-16 PROCEDURE — G8484 FLU IMMUNIZE NO ADMIN: HCPCS | Performed by: INTERNAL MEDICINE

## 2023-01-16 PROCEDURE — 3074F SYST BP LT 130 MM HG: CPT | Performed by: INTERNAL MEDICINE

## 2023-01-16 PROCEDURE — G8420 CALC BMI NORM PARAMETERS: HCPCS | Performed by: INTERNAL MEDICINE

## 2023-01-16 PROCEDURE — 99214 OFFICE O/P EST MOD 30 MIN: CPT | Performed by: INTERNAL MEDICINE

## 2023-01-16 PROCEDURE — 3046F HEMOGLOBIN A1C LEVEL >9.0%: CPT | Performed by: INTERNAL MEDICINE

## 2023-01-16 PROCEDURE — 3017F COLORECTAL CA SCREEN DOC REV: CPT | Performed by: INTERNAL MEDICINE

## 2023-01-16 PROCEDURE — 2022F DILAT RTA XM EVC RTNOPTHY: CPT | Performed by: INTERNAL MEDICINE

## 2023-01-16 PROCEDURE — 3078F DIAST BP <80 MM HG: CPT | Performed by: INTERNAL MEDICINE

## 2023-01-16 PROCEDURE — 1036F TOBACCO NON-USER: CPT | Performed by: INTERNAL MEDICINE

## 2023-01-16 RX ORDER — BLOOD-GLUCOSE SENSOR
EACH MISCELLANEOUS
Qty: 9 EACH | Refills: 1 | Status: SHIPPED | OUTPATIENT
Start: 2023-01-16

## 2023-01-16 RX ORDER — INSULIN DEGLUDEC INJECTION 100 U/ML
INJECTION, SOLUTION SUBCUTANEOUS
Qty: 15 ADJUSTABLE DOSE PRE-FILLED PEN SYRINGE | Refills: 1 | Status: SHIPPED | OUTPATIENT
Start: 2023-01-16

## 2023-01-16 RX ORDER — INSULIN LISPRO 100 [IU]/ML
INJECTION, SOLUTION INTRAVENOUS; SUBCUTANEOUS
Qty: 15 ADJUSTABLE DOSE PRE-FILLED PEN SYRINGE | Refills: 1 | Status: SHIPPED | OUTPATIENT
Start: 2023-01-16

## 2023-01-16 RX ORDER — PRAVASTATIN SODIUM 20 MG
20 TABLET ORAL DAILY
Qty: 90 TABLET | Refills: 1 | Status: SHIPPED | OUTPATIENT
Start: 2023-01-16

## 2023-01-16 RX ORDER — LISINOPRIL 5 MG/1
5 TABLET ORAL DAILY
Qty: 90 TABLET | Refills: 1 | Status: SHIPPED | OUTPATIENT
Start: 2023-01-16 | End: 2024-01-16

## 2023-01-16 RX ORDER — CETIRIZINE HYDROCHLORIDE 10 MG/1
10 TABLET ORAL DAILY
Qty: 90 TABLET | Refills: 1 | Status: SHIPPED | OUTPATIENT
Start: 2023-01-16

## 2023-01-16 RX ORDER — CITALOPRAM 10 MG/1
10 TABLET ORAL DAILY
Qty: 90 TABLET | Refills: 1 | Status: SHIPPED | OUTPATIENT
Start: 2023-01-16 | End: 2024-01-16

## 2023-01-16 ASSESSMENT — ENCOUNTER SYMPTOMS
WHEEZING: 0
SHORTNESS OF BREATH: 0
PHOTOPHOBIA: 0
CHEST TIGHTNESS: 0
ABDOMINAL DISTENTION: 0

## 2023-01-16 NOTE — ASSESSMENT & PLAN NOTE
Uncontrolled, continue current medications, medication adherence emphasized, lifestyle modifications recommended and will continue with endocrinology, will update labs   Key Antihyperglycemic Medications          insulin lispro, 1 Unit Dial, (HUMALOG/ADMELOG) 100 UNIT/ML SOPN (Taking)    Sig: Inject 8 units breakfast, 6 units lunch and 7 units supper    TRESIBA FLEXTOUCH 100 UNIT/ML SOPN (Taking)    Sig: ADMINISTER 34 UNITS UNDER THE SKIN EVERY MORNING

## 2023-01-16 NOTE — PATIENT INSTRUCTIONS
A referral has been placed for you. For colonoscopy and podiatry  Someone will call you with the details of the appointment and process. Please keep your appointment or be sure to call if you need to reschedule. If you have not received any call after 1 to 2 weeks please contact us.       Fasting blood test has been ordered, usual fast for at least 10 to 12 hours prior this test.

## 2023-01-16 NOTE — PROGRESS NOTES
Chief Complaint   Patient presents with    Follow-up     4 month f/u for Anxiety, A1c. Needs referral to [facility] at [facility]. Javid Hartley is a 47 y.o. male who presents today for Follow-up (4 month f/u for Anxiety, A1c. Needs referral to [facility] at [facility].  )     Tigao Mcmullen is here for follow-up in anxiety, and he needs to update his labs for diabetes, he is currently following with endocrine provider in [place], he has an appointment coming up in January 31 on the same day he has an orthopedic follow-up, he has been advised today to call her orthopedic provider, and reschedule,  Reports his diabetes continues to be in suboptimal control, sometimes he had high readings, in the 200s and 300s, requiring insulin and sliding scale which he used based on recommendations by endocrine. He denies any hypoglycemia events  Reports multiple dietary indiscretions. Currently due for eye examination, has not completed a colonoscopy, and is requesting a referral for colonoscopy at this time. He continues to live with his brother. He is requesting refill of his medications to be sent to another pharmacy  In regards to his anxiety, he has been taking medication as directed, denies any side effects and feels he is doing well with it    Wt Readings from Last 3 Encounters:   01/16/23 170 lb 12.8 oz (77.5 kg)   11/14/22 175 lb (79.4 kg)   09/15/22 169 lb (76.7 kg)     Vitals:    01/16/23 1042   BP: (!) 98/54   Site: Left Upper Arm   Position: Sitting   Pulse: 71   SpO2: 92%   Weight: 170 lb 12.8 oz (77.5 kg)   Height: 5' 10\" (1.778 m)        Assessment and plan:  1. Primary hypertension  Assessment & Plan:   At goal, continue current medications   Key Anti-Hypertensive Meds            lisinopril (PRINIVIL;ZESTRIL) 5 MG tablet (Taking)    Sig - Route: Take 1 tablet by mouth daily - Oral            Orders:  -     lisinopril (PRINIVIL;ZESTRIL) 5 MG tablet;  Take 1 tablet by mouth daily, Disp-90 tablet, R-1Normal  2. Seasonal allergies  -     cetirizine (ZYRTEC) 10 MG tablet; Take 1 tablet by mouth daily, Disp-90 tablet, R-1Normal  3. Type 1 diabetes mellitus with other specified complication [facility])  Assessment & Plan:   Uncontrolled, continue current medications, medication adherence emphasized, lifestyle modifications recommended and will continue with endocrinology, will update labs   Key Antihyperglycemic Medications            insulin lispro, 1 Unit Dial, (HUMALOG/ADMELOG) 100 UNIT/ML SOPN (Taking)    Sig: Inject 8 units breakfast, 6 units lunch and 7 units supper    TRESIBA FLEXTOUCH 100 UNIT/ML SOPN (Taking)    Sig: ADMINISTER 34 UNITS UNDER THE SKIN EVERY MORNING            Orders:  -     insulin lispro, 1 Unit Dial, (HUMALOG/ADMELOG) 100 UNIT/ML SOPN; Inject 8 units breakfast, 6 units lunch and 7 units supper, Disp-15 Adjustable Dose Pre-filled Pen Syringe, R-1Normal  -     TRESIBA FLEXTOUCH 100 UNIT/ML SOPN; ADMINISTER 34 UNITS UNDER THE SKIN EVERY MORNING, Disp-15 Adjustable Dose Pre-filled Pen Syringe, R-1, DAWNormal  -     CBC with Auto Differential; Future  -     Lipid Panel; Future  -     Hemoglobin A1C; Future  -     Microalbumin / Creatinine Urine Ratio; Future  -     Comprehensive Metabolic Panel; Future  -     TSH with Reflex; Future  -     Hepatitis C Antibody; Future  -     HIV 1/2 Ag/Ab, 4TH Generation,W Rflx Confirm; Future  -     Continuous Blood Gluc Sensor (DEXCOM G6 SENSOR) MISC; Change sensor every 10 days, Disp-9 each, R-1Normal  -     [facility] - Natalya Rico DPM, [facility], [place]  4. Mixed hyperlipidemia  -     pravastatin (PRAVACHOL) 20 MG tablet; Take 1 tablet by mouth daily, Disp-90 tablet, R-1Normal  5. Other depression  Assessment & Plan:   Asymptomatic, continue current medications and medication adherence emphasized  Orders:  -     citalopram (CELEXA) 10 MG tablet; Take 1 tablet by mouth daily, Disp-90 tablet, R-1Normal  6. Anxiety  -     citalopram (CELEXA) 10 MG tablet; Take 1 tablet by mouth daily, Disp-90 tablet, R-1Normal  7. Screen for colon cancer  -     [facility]- Colonoscopy    Importance of proper follow up  including : yearly diabetic eye and foot exam,  complaince with life style modification , diet , labs monitoring , office visits were discussed with patient, diabetes diet has been given to the patient. Discussed about physiopathology of diabetes, long-term treatment, and long-term consequences. Return in about 3 months (around 4/16/2023) for AWV, hyperlipidemia, diabetes, anxiety/depression. Review of system:    Review of Systems   Constitutional:  Negative for activity change, fatigue and unexpected weight change. Eyes:  Negative for photophobia and visual disturbance. Respiratory:  Negative for chest tightness, shortness of breath and wheezing. Cardiovascular:  Negative for chest pain, palpitations and leg swelling. Gastrointestinal:  Negative for abdominal distention. Endocrine: Positive for polydipsia, polyphagia and polyuria. Genitourinary:  Negative for difficulty urinating and frequency. Musculoskeletal:  Negative for myalgias. Neurological:  Negative for dizziness and headaches.        Immunization history:    Immunization History   Administered Date(s) Administered    COVID-19, J&J, (age 18y+), IM, 0.5 mL 08/04/2021, 12/28/2021    Influenza Virus Vaccine 10/19/2018    Influenza, FLUBLOK, (age 25 y+), PF, 0.5mL 11/08/2019, 11/03/2020, 12/22/2021    Influenza, Triv, 3 Years and older, IM (Afluria (5 yrs and older) 10/12/2012    Pneumococcal Conjugate 13-valent ([id number]) 05/02/2016    Pneumococcal Polysaccharide ([id number]) 09/09/2015, 10/19/2016    Tdap (Boostrix, Adacel) 05/01/2014, 05/02/2016       Current medications:      Current Outpatient Medications:     cetirizine (ZYRTEC) 10 MG tablet, Take 1 tablet by mouth daily, Disp: 90 tablet, Rfl: 1    citalopram (CELEXA) 10 MG tablet, Take 1 tablet by mouth daily, Disp: 90 tablet, Rfl: 1    insulin lispro, 1 Unit Dial, (HUMALOG/ADMELOG) 100 UNIT/ML SOPN, Inject 8 units breakfast, 6 units lunch and 7 units supper, Disp: 15 Adjustable Dose Pre-filled Pen Syringe, Rfl: 1    lisinopril (PRINIVIL;ZESTRIL) 5 MG tablet, Take 1 tablet by mouth daily, Disp: 90 tablet, Rfl: 1    pravastatin (PRAVACHOL) 20 MG tablet, Take 1 tablet by mouth daily, Disp: 90 tablet, Rfl: 1    TRESIBA FLEXTOUCH 100 UNIT/ML SOPN, ADMINISTER 34 UNITS UNDER THE SKIN EVERY MORNING, Disp: 15 Adjustable Dose Pre-filled Pen Syringe, Rfl: 1    Continuous Blood Gluc Sensor (DEXCOM G6 SENSOR) MISC, Change sensor every 10 days, Disp: 9 each, Rfl: 1      Family history:    No family history on file. Past medical history:    Past Medical History:   Diagnosis Date    Anxiety     Depression     Diabetes ([facility] [address]     High cholesterol     Hypertension         Past Surgical History:   Procedure Laterality Date    APPENDECTOMY          Physical exam:    BP (!) 98/54 (Site: Left Upper Arm, Position: Sitting)   Pulse 71   Ht 5' 10\" (1.778 m)   Wt 170 lb 12.8 oz (77.5 kg)   SpO2 92%   BMI 24.51 kg/m²     Physical Exam  Vitals reviewed. Constitutional:       Appearance: Normal appearance. HENT:      Head: Normocephalic and atraumatic. Cardiovascular:      Rate and Rhythm: Normal rate and regular rhythm. Pulmonary:      Effort: Pulmonary effort is normal.      Breath sounds: Normal breath sounds. Neurological:      General: No focal deficit present. Mental Status: He is alert and oriented to person, place, and time.    Psychiatric:         Mood and Affect: Mood normal.      Diabetic foot exam:   Left Foot:   Visual Exam: callous- hallux and plantar area   Pulse DP: 2+ (normal)   Filament test: reduced sensation   Vibratory Sensation: normal  Right Foot:   Visual Exam: callous- hallux callosities ,  BL tickening of toe nails , dry skin ,    Pulse DP: 2+ (normal)   Filament test: reduced sensation   Vibratory Sensation: normal          This document was generated with the aid of voice recognition software. . Please be aware that there may be inadvertent transcription errors not identified and corrected by the [employer]

## 2023-01-16 NOTE — ASSESSMENT & PLAN NOTE
At goal, continue current medications   Key Anti-Hypertensive Meds          lisinopril (PRINIVIL;ZESTRIL) 5 MG tablet (Taking)    Sig - Route:  Take 1 tablet by mouth daily - Oral

## 2023-01-17 ENCOUNTER — TELEPHONE (OUTPATIENT)
Dept: INTERNAL MEDICINE CLINIC | Facility: CLINIC | Age: 53
End: 2023-01-17

## 2023-01-17 NOTE — TELEPHONE ENCOUNTER
Raul from [facility] called said [employer] is back-ordered and no date as to when it will be back in stock. Pt' prescription said PAULA. The generic is from the same company. Can he give pt generic? ?

## 2023-02-03 ENCOUNTER — OFFICE VISIT (OUTPATIENT)
Dept: INTERNAL MEDICINE CLINIC | Facility: CLINIC | Age: 53
End: 2023-02-03
Payer: MEDICARE

## 2023-02-03 VITALS
SYSTOLIC BLOOD PRESSURE: 106 MMHG | BODY MASS INDEX: 24.71 KG/M2 | WEIGHT: 172.6 LBS | OXYGEN SATURATION: 98 % | DIASTOLIC BLOOD PRESSURE: 64 MMHG | HEART RATE: 58 BPM | HEIGHT: 70 IN

## 2023-02-03 DIAGNOSIS — Z12.11 SCREEN FOR COLON CANCER: ICD-10-CM

## 2023-02-03 DIAGNOSIS — E10.69 TYPE 1 DIABETES MELLITUS WITH OTHER SPECIFIED COMPLICATION (HCC): ICD-10-CM

## 2023-02-03 DIAGNOSIS — R23.9 ALTERATION IN SKIN INTEGRITY IN ADULT: Primary | ICD-10-CM

## 2023-02-03 PROCEDURE — G8420 CALC BMI NORM PARAMETERS: HCPCS | Performed by: INTERNAL MEDICINE

## 2023-02-03 PROCEDURE — 3046F HEMOGLOBIN A1C LEVEL >9.0%: CPT | Performed by: INTERNAL MEDICINE

## 2023-02-03 PROCEDURE — 1036F TOBACCO NON-USER: CPT | Performed by: INTERNAL MEDICINE

## 2023-02-03 PROCEDURE — 3074F SYST BP LT 130 MM HG: CPT | Performed by: INTERNAL MEDICINE

## 2023-02-03 PROCEDURE — G8427 DOCREV CUR MEDS BY ELIG CLIN: HCPCS | Performed by: INTERNAL MEDICINE

## 2023-02-03 PROCEDURE — 2022F DILAT RTA XM EVC RTNOPTHY: CPT | Performed by: INTERNAL MEDICINE

## 2023-02-03 PROCEDURE — 3017F COLORECTAL CA SCREEN DOC REV: CPT | Performed by: INTERNAL MEDICINE

## 2023-02-03 PROCEDURE — 99214 OFFICE O/P EST MOD 30 MIN: CPT | Performed by: INTERNAL MEDICINE

## 2023-02-03 PROCEDURE — G8484 FLU IMMUNIZE NO ADMIN: HCPCS | Performed by: INTERNAL MEDICINE

## 2023-02-03 PROCEDURE — 3078F DIAST BP <80 MM HG: CPT | Performed by: INTERNAL MEDICINE

## 2023-02-03 RX ORDER — CEPHALEXIN 500 MG/1
500 CAPSULE ORAL 3 TIMES DAILY
Qty: 30 CAPSULE | Refills: 0 | Status: SHIPPED | OUTPATIENT
Start: 2023-02-03 | End: 2023-02-13

## 2023-02-03 RX ORDER — LANCING DEVICE
EACH MISCELLANEOUS
COMMUNITY
Start: 2023-01-24

## 2023-02-03 ASSESSMENT — ENCOUNTER SYMPTOMS
COUGH: 0
SHORTNESS OF BREATH: 0
ABDOMINAL DISTENTION: 0

## 2023-02-03 ASSESSMENT — PATIENT HEALTH QUESTIONNAIRE - PHQ9
SUM OF ALL RESPONSES TO PHQ QUESTIONS 1-9: 0
1. LITTLE INTEREST OR PLEASURE IN DOING THINGS: 0
SUM OF ALL RESPONSES TO PHQ QUESTIONS 1-9: 0

## 2023-02-03 ASSESSMENT — ANXIETY QUESTIONNAIRES: 1. FEELING NERVOUS, ANXIOUS, OR ON EDGE: 0

## 2023-02-03 NOTE — PROGRESS NOTES
Chief Complaint   Patient presents with    Follow-up     Follow up for DM. Right shin is red. Left is a small area of redness. Reese Nolen is a 47 y.o. male who presents today for Follow-up (Follow up for DM. /Right shin is red. Left is a small area of redness.)     Deidre Pimentel is here today concerned about redness in his shins, this has been an ongoing issue for many years, but over the last 2 days he has been noticing an area of more redness, he was recently seen by his endocrinologist, insulin has been adjusted, hemoglobin A1c continues to be elevated,  He also had his insurance visit and they are suggesting possible peripheral vascular disease, he has had vascular testing in the past including WATSON in February 2019 which was normal                    Wt Readings from Last 3 Encounters:   02/03/23 172 lb 9.6 oz (78.3 kg)   01/16/23 170 lb 12.8 oz (77.5 kg)   11/14/22 175 lb (79.4 kg)     Vitals:    02/03/23 1135   BP: 106/64   Site: Right Upper Arm   Position: Sitting   Pulse: 58   SpO2: 98%   Weight: 172 lb 9.6 oz (78.3 kg)   Height: 5' 10\" (1.778 m)        Assessment and plan:  1. Alteration in skin integrity in adult  -     cephALEXin (KEFLEX) 500 MG capsule; Take 1 capsule by mouth 3 times daily for 10 days, Disp-30 capsule, R-0Normal  2. Type 1 diabetes mellitus with other specified complication ([facility]  -     [facility]- Colonoscopy  3. Screen for colon cancer  -     [facility]- Colonoscopy  Based on today examination there is not significant changes of cellulitis, or infection, his lesions seems to be associated with chronic eczematous changes, he had a normal WATSON couple years ago, good perfusion, we discussed about proper skin care including body lotions, moisturizers, and because he has not is some slight changes in coloration, we decided to try short course of antibiotic with Keflex for 7 to 10 days.     Return if symptoms worsen or fail to improve, for as planned for AWV.     Review of system:    Review of Systems   Constitutional:  Negative for activity change, appetite change, fatigue, fever and unexpected weight change.   Respiratory:  Negative for cough and shortness of breath.    Cardiovascular:  Negative for leg swelling.   Gastrointestinal:  Negative for abdominal distention.   Endocrine: Negative for polyphagia.   Skin:  Positive for wound.   Neurological:  Negative for dizziness and headaches.   Psychiatric/Behavioral:  The patient is not nervous/anxious.        Immunization history:    Immunization History   Administered Date(s) Administered    COVID-19, J&J, (age 18y+), IM, 0.5 mL 08/04/2021, 12/28/2021    Influenza Virus Vaccine 10/19/2018    Influenza, FLUBLOK, (age 18 y+), PF, 0.5mL 11/08/2019, 11/03/2020, 12/22/2021    Influenza, Triv, 3 Years and older, IM (Afluria (5 yrs and older) 10/12/2012    Pneumococcal Conjugate 13-valent ([id number]) 05/02/2016    Pneumococcal Polysaccharide ([id number]) 09/09/2015, 10/19/2016    Tdap (Boostrix, Adacel) 05/01/2014, 05/02/2016       Current medications:      Current Outpatient Medications:     COMFORT EZ PEN NEEDLES 31G X 8 MM MISC, , Disp: , Rfl:     cephALEXin (KEFLEX) 500 MG capsule, Take 1 capsule by mouth 3 times daily for 10 days, Disp: 30 capsule, Rfl: 0    cetirizine (ZYRTEC) 10 MG tablet, Take 1 tablet by mouth daily, Disp: 90 tablet, Rfl: 1    citalopram (CELEXA) 10 MG tablet, Take 1 tablet by mouth daily, Disp: 90 tablet, Rfl: 1    insulin lispro, 1 Unit Dial, (HUMALOG/ADMELOG) 100 UNIT/ML SOPN, Inject 8 units breakfast, 6 units lunch and 7 units supper, Disp: 15 Adjustable Dose Pre-filled Pen Syringe, Rfl: 1    lisinopril (PRINIVIL;ZESTRIL) 5 MG tablet, Take 1 tablet by mouth daily, Disp: 90 tablet, Rfl: 1    pravastatin (PRAVACHOL) 20 MG tablet, Take 1 tablet by mouth daily, Disp: 90 tablet, Rfl: 1    TRESIBA FLEXTOUCH 100 UNIT/ML SOPN, ADMINISTER 34 UNITS UNDER THE SKIN EVERY  MORNING, Disp: 15 Adjustable Dose Pre-filled Pen Syringe, Rfl: 1    Continuous Blood Gluc Sensor (DEXCOM G6 SENSOR) MISC, Change sensor every 10 days, Disp: 9 each, Rfl: 1      Family history:    History reviewed. No pertinent family history. Past medical history:    Past Medical History:   Diagnosis Date    Anxiety     Depression     Diabetes ([address]     High cholesterol     Hypertension         Past Surgical History:   Procedure Laterality Date    APPENDECTOMY          Physical exam:    /64 (Site: Right Upper Arm, Position: Sitting)   Pulse 58   Ht 5' 10\" (1.778 m)   Wt 172 lb 9.6 oz (78.3 kg)   SpO2 98%   BMI 24.77 kg/m²     Physical Exam  Vitals reviewed. Constitutional:       Appearance: Normal appearance. Cardiovascular:      Rate and Rhythm: Normal rate. Pulmonary:      Effort: Pulmonary effort is normal.   Skin:     Findings: Erythema, lesion and rash present. Comments: Refer no pictures , no active discharge   Neurological:      Mental Status: He is alert. This document was generated with the aid of voice recognition software. . Please be aware that there may be inadvertent transcription errors not identified and corrected by the [employer]

## 2023-02-15 ENCOUNTER — OFFICE VISIT (OUTPATIENT)
Dept: INTERNAL MEDICINE CLINIC | Facility: CLINIC | Age: 53
End: 2023-02-15

## 2023-02-15 VITALS
HEART RATE: 75 BPM | HEIGHT: 70 IN | SYSTOLIC BLOOD PRESSURE: 110 MMHG | DIASTOLIC BLOOD PRESSURE: 60 MMHG | WEIGHT: 173.6 LBS | BODY MASS INDEX: 24.85 KG/M2

## 2023-02-15 DIAGNOSIS — S81.801D OPEN WOUND OF BOTH LOWER EXTREMITIES, SUBSEQUENT ENCOUNTER: Primary | ICD-10-CM

## 2023-02-15 DIAGNOSIS — E10.69 TYPE 1 DIABETES MELLITUS WITH OTHER SPECIFIED COMPLICATION (HCC): ICD-10-CM

## 2023-02-15 DIAGNOSIS — S81.802D OPEN WOUND OF BOTH LOWER EXTREMITIES, SUBSEQUENT ENCOUNTER: Primary | ICD-10-CM

## 2023-02-15 SDOH — ECONOMIC STABILITY: FOOD INSECURITY: WITHIN THE PAST 12 MONTHS, THE FOOD YOU BOUGHT JUST DIDN'T LAST AND YOU DIDN'T HAVE MONEY TO GET MORE.: PATIENT DECLINED

## 2023-02-15 SDOH — ECONOMIC STABILITY: INCOME INSECURITY: HOW HARD IS IT FOR YOU TO PAY FOR THE VERY BASICS LIKE FOOD, HOUSING, MEDICAL CARE, AND HEATING?: NOT HARD AT ALL

## 2023-02-15 SDOH — ECONOMIC STABILITY: FOOD INSECURITY: WITHIN THE PAST 12 MONTHS, YOU WORRIED THAT YOUR FOOD WOULD RUN OUT BEFORE YOU GOT MONEY TO BUY MORE.: NEVER TRUE

## 2023-02-15 SDOH — ECONOMIC STABILITY: HOUSING INSECURITY
IN THE LAST 12 MONTHS, WAS THERE A TIME WHEN YOU DID NOT HAVE A STEADY PLACE TO SLEEP OR SLEPT IN A SHELTER (INCLUDING NOW)?: NO

## 2023-02-15 ASSESSMENT — ENCOUNTER SYMPTOMS
CHEST TIGHTNESS: 0
ABDOMINAL DISTENTION: 0

## 2023-02-15 NOTE — PROGRESS NOTES
Chief Complaint   Patient presents with    Follow-up     Bilateral  shin, left> right ,cephalexin caused big blister, finished taking them but continue to have redness and warmth         Jo Au is a 47 y.o. male who presents today for Follow-up (Bilateral  shin, left> right ,cephalexin caused big blister, finished taking them but continue to have redness and warmth )     He is here for follow-up after recent treatment with antibiotic therapy for bilateral lower extremity swelling, he completed treatment with Keflex, but since last visit, he developed a large scab in left shin, he denies any fever, chills, and denies any significant changes in his sugars, they continue to be in the 190s, after eating. He has dry skin, but he is not currently using any lotions,                        Wt Readings from Last 3 Encounters:   02/15/23 173 lb 9.6 oz (78.7 kg)   02/03/23 172 lb 9.6 oz (78.3 kg)   01/16/23 170 lb 12.8 oz (77.5 kg)     Vitals:    02/15/23 1257   BP: 110/60   Site: Left Upper Arm   Position: Sitting   Pulse: 75   Weight: 173 lb 9.6 oz (78.7 kg)   Height: 5' 10\" (1.778 m)        Assessment and plan:  1. Open wound of both lower extremities, subsequent encounter  -     [facility], Wound Care  2. Type 1 diabetes mellitus with other specified complication [facility]  -     [facility], Wound [facility]    Since last visit there is a new formation of a scab on the anterior shin, erythema continue to be stable, dry skin, will ask the wound care center for evaluation, for recommendations regarding skin care, and wound care.   He is a high risk for infections, due to his diabetes, we discussed about skin care, considering adding body lotions like Eucerin, Cetaphil, or CeraVe   My staff will call the wound care to see if he can be seen within the next few days    Review of system:    Review of Systems   Constitutional:  Negative for activity change, fatigue, fever and unexpected weight change. Respiratory:  Negative for chest tightness. Cardiovascular:  Positive for leg swelling. Gastrointestinal:  Negative for abdominal distention. Genitourinary:  Negative for difficulty urinating and frequency. Musculoskeletal:  Negative for myalgias. Skin:  Positive for rash and wound. Neurological:  Negative for dizziness and headaches. Immunization history:    Immunization History   Administered Date(s) Administered    COVID-19, J&J, (age 18y+), IM, 0.5 mL 08/04/2021, 12/28/2021    Influenza Virus Vaccine 10/19/2018    Influenza, FLUBLOK, (age 25 y+), PF, 0.5mL 11/08/2019, 11/03/2020, 12/22/2021    Influenza, Triv, 3 Years and older, IM (Afluria (5 yrs and older) 10/12/2012    Pneumococcal Conjugate 13-valent ([id number]) 05/02/2016    Pneumococcal Polysaccharide (Zazwstnen82) 09/09/2015, 10/19/2016    Tdap (Boostrix, Adacel) 05/01/2014, 05/02/2016       Current medications:      Current Outpatient Medications:     COMFORT EZ PEN NEEDLES 31G X 8 MM MISC, , Disp: , Rfl:     cetirizine (ZYRTEC) 10 MG tablet, Take 1 tablet by mouth daily, Disp: 90 tablet, Rfl: 1    citalopram (CELEXA) 10 MG tablet, Take 1 tablet by mouth daily, Disp: 90 tablet, Rfl: 1    insulin lispro, 1 Unit Dial, (HUMALOG/ADMELOG) 100 UNIT/ML SOPN, Inject 8 units breakfast, 6 units lunch and 7 units supper, Disp: 15 Adjustable Dose Pre-filled Pen Syringe, Rfl: 1    lisinopril (PRINIVIL;ZESTRIL) 5 MG tablet, Take 1 tablet by mouth daily, Disp: 90 tablet, Rfl: 1    pravastatin (PRAVACHOL) 20 MG tablet, Take 1 tablet by mouth daily, Disp: 90 tablet, Rfl: 1    TRESIBA FLEXTOUCH 100 UNIT/ML SOPN, ADMINISTER 34 UNITS UNDER THE SKIN EVERY MORNING, Disp: 15 Adjustable Dose Pre-filled Pen Syringe, Rfl: 1    Continuous Blood Gluc Sensor (DEXCOM G6 SENSOR) MISC, Change sensor every 10 days, Disp: 9 each, Rfl: 1      Family history:    History reviewed. No pertinent family history.      Past medical history:    Past Medical History:   Diagnosis Date    Anxiety     Depression     Diabetes ([address]     High cholesterol     Hypertension         Past Surgical History:   Procedure Laterality Date    APPENDECTOMY          Physical exam:    /60 (Site: Left Upper Arm, Position: Sitting)   Pulse 75   Ht 5' 10\" (1.778 m)   Wt 173 lb 9.6 oz (78.7 kg)   BMI 24.91 kg/m²     Physical Exam  Vitals reviewed. Cardiovascular:      Rate and Rhythm: Normal rate. Pulmonary:      Effort: Pulmonary effort is normal.   Skin:     Comments: Refer To pics   Neurological:      Mental Status: He is alert. Mental status is at baseline. Recent labs:    No results found for: LABA1C     No results found for: CHOL  No results found for: TRIG  No results found for: HDL  No results found for: LDLCHOLESTEROL, LDLCALC  No results found for: LABVLDL, VLDL  No results found for: CHOLHDLRATIO    No results found for: TSHFT4, TSH, TSHREFLEX, FFS8QWY    No results found for: NA, K, CL, CO2, BUN, CREATININE, GLUCOSE, CALCIUM, PROT, LABALBU, BILITOT, ALKPHOS, AST, ALT, LABGLOM, GFRAA, AGRATIO, GLOB    No results found for: WBC, HGB, HCT, MCV, PLT          This document was generated with the aid of voice recognition software. . Please be aware that there may be inadvertent transcription errors not identified and corrected by the [employer]

## 2023-03-01 ENCOUNTER — OFFICE VISIT (OUTPATIENT)
Dept: ORTHOPEDIC SURGERY | Age: 53
End: 2023-03-01
Payer: MEDICARE

## 2023-03-01 DIAGNOSIS — M72.0 DUPUYTREN'S CONTRACTURE OF LEFT HAND: Primary | ICD-10-CM

## 2023-03-01 DIAGNOSIS — M72.0 DUPUYTREN'S CONTRACTURE OF RIGHT HAND: ICD-10-CM

## 2023-03-01 PROCEDURE — 99214 OFFICE O/P EST MOD 30 MIN: CPT | Performed by: ORTHOPAEDIC SURGERY

## 2023-03-01 PROCEDURE — G8420 CALC BMI NORM PARAMETERS: HCPCS | Performed by: ORTHOPAEDIC SURGERY

## 2023-03-01 PROCEDURE — 3017F COLORECTAL CA SCREEN DOC REV: CPT | Performed by: ORTHOPAEDIC SURGERY

## 2023-03-01 PROCEDURE — 1036F TOBACCO NON-USER: CPT | Performed by: ORTHOPAEDIC SURGERY

## 2023-03-01 PROCEDURE — G8484 FLU IMMUNIZE NO ADMIN: HCPCS | Performed by: ORTHOPAEDIC SURGERY

## 2023-03-01 PROCEDURE — G8428 CUR MEDS NOT DOCUMENT: HCPCS | Performed by: ORTHOPAEDIC SURGERY

## 2023-03-01 NOTE — PROGRESS NOTES
Orthopaedic Hand Clinic Note    Name: Donnella Goldberg  YOB: 1970  Gender: male  MRN: [id number]      CC: Patient referred for evaluation of upper extremity pain    HPI: Donnella Goldberg is a 47 y.o. male Right hand dominant with a chief complaint of bilateral hand Dupuytren contracture, symptoms have been progressing for over a year, the right ring finger is causing severe impairment of his everyday life because he gets in the way of everything. ROS/Meds/PSH/PMH/FH/SH: I personally reviewed the patients standard intake form. Pertinents are discussed in the HPI    Physical Examination:  General: Awake and alert. HEENT: Normocephalic, atraumatic  CV/Pulm: Breathing even and unlabored  Skin: No obvious rashes noted. Lymphatic: No obvious evidence of lymphedema or lymphadenopathy    Musculoskeletal Exam:  Examination on the right upper extremity demonstrates cap refill < 5 seconds in all fingers, Dupuytren cords along the ring and small finger causing 0 degree contracture at the small finger MCP joint, 25 degree contracture of the small finger PIP joint, 25 degree contracture at the ring finger MCP joint, 90 degree contracture at the ring finger PIP joint, he has knuckle pads on the right small, ring, middle and index finger PIP joints dorsally. Palpable Dupuytren cord on the left hand along the small finger causing 20 degree contracture at the PIP joint, no contracture at the MCP joint, he has knuckle pads of the left hand along the index, middle and ring finger PIP joints dorsally. Imaging / Electrodiagnostic Tests:     none    Assessment:   1. Dupuytren's contracture of left hand    2. Dupuytren's contracture of right hand        Plan:   We discussed the diagnosis and different treatment options. We discussed observation, therapy, antiinflammatory medications and other pertinent treatment modalities.     After discussing in detail the patient elects to proceed with [facility] injection and manipulation, we discussed all treatment options in detail including surgical excision of the fascia, observation and Xiaflex injections, patient would like to proceed with Xiaflex injection and manipulation due to the less invasive nature of the procedure, he understands that there is a higher recurrence rate with Xiaflex injection then with surgery but either way the recurrence rate is not 0 with either procedure. Patient voiced accordance and understanding of the information provided and the formulated plan. All questions were answered to the patient's satisfaction during the encounter.     Jessica Torres MD, PhD  Orthopaedic Surgery  03/01/23  2:37 PM

## 2023-03-09 ENCOUNTER — TELEPHONE (OUTPATIENT)
Dept: ORTHOPEDIC SURGERY | Age: 53
End: 2023-03-09

## 2023-03-09 NOTE — TELEPHONE ENCOUNTER
Called and spoke with Chidi Richardson with [employer] and was advised, insurance cards were too blurry via fax, Jony Joiner was able to send clearer image via email to [facility] Double is still processing, pt is aware.

## 2023-03-15 ENCOUNTER — TELEPHONE (OUTPATIENT)
Dept: ORTHOPEDIC SURGERY | Age: 53
End: 2023-03-15

## 2023-03-15 NOTE — TELEPHONE ENCOUNTER
Prescription is ready to be shipped. They have called the patient 3x at home number and the patient has not returned their call.  They called our office on Monday to get an alternate phone number and still never called back. They will no longer call the patient.  If the patient still needs to prescription, he needs to call them at [phone], M-F from 8:30 am to 8:30 pm.

## 2023-03-22 ENCOUNTER — TELEPHONE (OUTPATIENT)
Dept: ORTHOPEDIC SURGERY | Age: 53
End: 2023-03-22

## 2023-03-22 NOTE — TELEPHONE ENCOUNTER
Called [facility] back and advised NOT to send xialfex to [facility]. All Xialfex medication for Dr. Ruth Real goes to [address] office.

## 2023-03-27 DIAGNOSIS — M72.0 DUPUYTREN'S CONTRACTURE OF RIGHT HAND: Primary | ICD-10-CM

## 2023-03-29 ENCOUNTER — TELEMEDICINE (OUTPATIENT)
Dept: INTERNAL MEDICINE CLINIC | Facility: CLINIC | Age: 53
End: 2023-03-29
Payer: MEDICARE

## 2023-03-29 DIAGNOSIS — R05.1 ACUTE COUGH: ICD-10-CM

## 2023-03-29 DIAGNOSIS — R09.81 SINUS CONGESTION: Primary | ICD-10-CM

## 2023-03-29 PROCEDURE — 99214 OFFICE O/P EST MOD 30 MIN: CPT | Performed by: INTERNAL MEDICINE

## 2023-03-29 PROCEDURE — G8428 CUR MEDS NOT DOCUMENT: HCPCS | Performed by: INTERNAL MEDICINE

## 2023-03-29 PROCEDURE — 3017F COLORECTAL CA SCREEN DOC REV: CPT | Performed by: INTERNAL MEDICINE

## 2023-03-29 RX ORDER — FLUTICASONE PROPIONATE 50 MCG
2 SPRAY, SUSPENSION (ML) NASAL DAILY
Qty: 48 G | Refills: 1 | Status: SHIPPED | OUTPATIENT
Start: 2023-03-29

## 2023-03-29 ASSESSMENT — ENCOUNTER SYMPTOMS
RHINORRHEA: 1
SHORTNESS OF BREATH: 0
COUGH: 1
WHEEZING: 0
SORE THROAT: 0

## 2023-03-29 NOTE — PROGRESS NOTES
Anatoliy Benavides (:  1970) is a Established patient, here for evaluation of the following:  Chief Complaint   Patient presents with    Cough         Assessment & Plan   Below is the assessment and plan developed based on review of pertinent history, physical exam, labs, studies, and medications. 1. Sinus congestion  -     fluticasone (FLONASE) 50 MCG/ACT nasal spray; 2 sprays by Each Nostril route daily, Disp-48 g, R-1Normal  2. Acute cough  -     fluticasone (FLONASE) 50 MCG/ACT nasal spray; 2 sprays by Each Nostril route daily, Disp-48 g, R-1Normal    Discussed with patient supportive measures normal saline solution in each nostril, flonase  , humidifer , increase fluid intake , tylenol or Nsaids PRN , (if not Contraindicated), Red flags explained : cough > 4 weeks , fever > 48 hours with not response to tylenol , worsening of symptoms , bloody sputum. , if any o this was advised to come back    Return if symptoms worsen or fail to improve. Subjective   Virtual visit for increasing cough, sinus congestion, postnasal drip, cough is worse when he lays down, he denies any fever, increasing shortness of breath or sputum production, he has not tried anything over-the-counter yet, he feels that symptoms are better now than last week, he has been already awake with the symptoms, and he denies any fever, he denies any changes in taste or smell. Cough  Associated symptoms include postnasal drip and rhinorrhea. Pertinent negatives include no chest pain, chills, fever, sore throat, shortness of breath or wheezing. Review of Systems   Constitutional:  Negative for appetite change, chills, fatigue and fever. HENT:  Positive for congestion, postnasal drip, rhinorrhea and sneezing. Negative for sore throat. Respiratory:  Positive for cough. Negative for shortness of breath and wheezing. Cardiovascular:  Negative for chest pain.         Objective   Patient-Reported Vitals  No data recorded

## 2023-04-14 ENCOUNTER — OFFICE VISIT (OUTPATIENT)
Age: 53
End: 2023-04-14

## 2023-04-14 DIAGNOSIS — M79.644 FINGER PAIN, RIGHT: ICD-10-CM

## 2023-04-14 DIAGNOSIS — M72.0 DUPUYTREN'S CONTRACTURE OF RIGHT HAND: Primary | ICD-10-CM

## 2023-04-14 DIAGNOSIS — M79.89 SWELLING OF FINGER, RIGHT: ICD-10-CM

## 2023-04-17 ENCOUNTER — CARE COORDINATION (OUTPATIENT)
Dept: CARE COORDINATION | Facility: CLINIC | Age: 53
End: 2023-04-17

## 2023-04-17 ENCOUNTER — OFFICE VISIT (OUTPATIENT)
Dept: INTERNAL MEDICINE CLINIC | Facility: CLINIC | Age: 53
End: 2023-04-17
Payer: MEDICARE

## 2023-04-17 VITALS
BODY MASS INDEX: 23.34 KG/M2 | DIASTOLIC BLOOD PRESSURE: 68 MMHG | HEART RATE: 79 BPM | WEIGHT: 163 LBS | SYSTOLIC BLOOD PRESSURE: 120 MMHG | HEIGHT: 70 IN

## 2023-04-17 DIAGNOSIS — Z00.00 MEDICARE ANNUAL WELLNESS VISIT, SUBSEQUENT: Primary | ICD-10-CM

## 2023-04-17 DIAGNOSIS — E78.2 MIXED HYPERLIPIDEMIA: ICD-10-CM

## 2023-04-17 DIAGNOSIS — F32.89 OTHER DEPRESSION: ICD-10-CM

## 2023-04-17 DIAGNOSIS — F41.9 ANXIETY: ICD-10-CM

## 2023-04-17 DIAGNOSIS — I10 PRIMARY HYPERTENSION: ICD-10-CM

## 2023-04-17 DIAGNOSIS — E10.69 TYPE 1 DIABETES MELLITUS WITH OTHER SPECIFIED COMPLICATION (HCC): ICD-10-CM

## 2023-04-17 DIAGNOSIS — K08.9 DENTAL DISORDER: ICD-10-CM

## 2023-04-17 DIAGNOSIS — J30.2 SEASONAL ALLERGIES: ICD-10-CM

## 2023-04-17 PROCEDURE — 3078F DIAST BP <80 MM HG: CPT | Performed by: INTERNAL MEDICINE

## 2023-04-17 PROCEDURE — 3074F SYST BP LT 130 MM HG: CPT | Performed by: INTERNAL MEDICINE

## 2023-04-17 PROCEDURE — 3017F COLORECTAL CA SCREEN DOC REV: CPT | Performed by: INTERNAL MEDICINE

## 2023-04-17 PROCEDURE — 3046F HEMOGLOBIN A1C LEVEL >9.0%: CPT | Performed by: INTERNAL MEDICINE

## 2023-04-17 PROCEDURE — G0439 PPPS, SUBSEQ VISIT: HCPCS | Performed by: INTERNAL MEDICINE

## 2023-04-17 RX ORDER — CITALOPRAM 10 MG/1
10 TABLET ORAL DAILY
Qty: 90 TABLET | Refills: 1 | Status: SHIPPED | OUTPATIENT
Start: 2023-04-17 | End: 2024-04-16

## 2023-04-17 RX ORDER — CETIRIZINE HYDROCHLORIDE 10 MG/1
10 TABLET ORAL DAILY
Qty: 90 TABLET | Refills: 1 | Status: SHIPPED | OUTPATIENT
Start: 2023-04-17

## 2023-04-17 RX ORDER — LISINOPRIL 5 MG/1
5 TABLET ORAL DAILY
Qty: 90 TABLET | Refills: 1 | Status: SHIPPED | OUTPATIENT
Start: 2023-04-17 | End: 2024-04-16

## 2023-04-17 RX ORDER — PRAVASTATIN SODIUM 20 MG
20 TABLET ORAL DAILY
Qty: 90 TABLET | Refills: 1 | Status: SHIPPED | OUTPATIENT
Start: 2023-04-17

## 2023-04-17 RX ORDER — INSULIN LISPRO 100 [IU]/ML
INJECTION, SOLUTION INTRAVENOUS; SUBCUTANEOUS
Qty: 15 ADJUSTABLE DOSE PRE-FILLED PEN SYRINGE | Refills: 1 | Status: SHIPPED | OUTPATIENT
Start: 2023-04-17

## 2023-04-17 ASSESSMENT — PATIENT HEALTH QUESTIONNAIRE - PHQ9
SUM OF ALL RESPONSES TO PHQ QUESTIONS 1-9: 0
SUM OF ALL RESPONSES TO PHQ QUESTIONS 1-9: 0
5. POOR APPETITE OR OVEREATING: 0
10. IF YOU CHECKED OFF ANY PROBLEMS, HOW DIFFICULT HAVE THESE PROBLEMS MADE IT FOR YOU TO DO YOUR WORK, TAKE CARE OF THINGS AT HOME, OR GET ALONG WITH OTHER PEOPLE: 0
6. FEELING BAD ABOUT YOURSELF - OR THAT YOU ARE A FAILURE OR HAVE LET YOURSELF OR YOUR FAMILY DOWN: 0
9. THOUGHTS THAT YOU WOULD BE BETTER OFF DEAD, OR OF HURTING YOURSELF: 0
1. LITTLE INTEREST OR PLEASURE IN DOING THINGS: 0
SUM OF ALL RESPONSES TO PHQ QUESTIONS 1-9: 0
4. FEELING TIRED OR HAVING LITTLE ENERGY: 0
8. MOVING OR SPEAKING SO SLOWLY THAT OTHER PEOPLE COULD HAVE NOTICED. OR THE OPPOSITE, BEING SO FIGETY OR RESTLESS THAT YOU HAVE BEEN MOVING AROUND A LOT MORE THAN USUAL: 0
3. TROUBLE FALLING OR STAYING ASLEEP: 0
7. TROUBLE CONCENTRATING ON THINGS, SUCH AS READING THE NEWSPAPER OR WATCHING TELEVISION: 0
SUM OF ALL RESPONSES TO PHQ9 QUESTIONS 1 & 2: 0
2. FEELING DOWN, DEPRESSED OR HOPELESS: 0
SUM OF ALL RESPONSES TO PHQ QUESTIONS 1-9: 0

## 2023-04-17 ASSESSMENT — LIFESTYLE VARIABLES
HOW OFTEN DO YOU HAVE A DRINK CONTAINING ALCOHOL: NEVER
HOW MANY STANDARD DRINKS CONTAINING ALCOHOL DO YOU HAVE ON A TYPICAL DAY: PATIENT DOES NOT DRINK

## 2023-04-17 NOTE — ASSESSMENT & PLAN NOTE
Monitored by specialist- no acute findings meriting change in the plan   Reinforced compliance  Key Antihyperglycemic Medications          insulin lispro, 1 Unit Dial, (HUMALOG/ADMELOG) 100 UNIT/ML SOPN (Taking)    Sig: Inject 8 units breakfast, 6 units lunch and 7 units supper    TRESIBA FLEXTOUCH 100 UNIT/ML SOPN (Taking)    Sig: ADMINISTER 34 UNITS UNDER THE SKIN EVERY MORNING

## 2023-04-17 NOTE — CARE COORDINATION
JAE SAAVEDRA attempted to reach pt regarding needs for affordable dentistry. No answer-VM full. Pt has Medicaid so [facility] could be an option. There are a few other local practices that are affordable, as well. JAE SAAVEDRA will provide list to pt upon speaking to him-will try again later this week.

## 2023-04-17 NOTE — PATIENT INSTRUCTIONS
Learning About Vision Tests  What are vision tests? The four most common vision tests are visual acuity tests, refraction, visual field tests, and color vision tests. Visual acuity (sharpness) tests  These tests are used: To see if you need glasses or contact lenses. To monitor an eye problem. To check an eye injury. Visual acuity tests are done as part of routine exams. You may also have this test when you get your 's license or apply for some types of jobs. Visual field tests  These tests are used: To check for vision loss in any area of your range of vision. To screen for certain eye diseases. To look for nerve damage after a stroke, head injury, or other problem that could reduce blood flow to the brain. Refraction and color tests  A refraction test is done to find the right prescription for glasses and contact lenses. A color vision test is done to check for color blindness. Color vision is often tested as part of a routine exam. You may also have this test when you apply for a job where recognizing different colors is important, such as , electronics, or the [employer]. How are vision tests done? Visual acuity test   You cover one eye at a time. You read aloud from a wall chart across the room. You read aloud from a small card that you hold in your hand. Refraction   You look into a special device. The device puts lenses of different strengths in front of each eye to see how strong your glasses or contact lenses need to be. Visual field tests   Your doctor may have you look through special machines. Or your doctor may simply have you stare straight ahead while they move a finger into and out of your field of vision. Color vision test   You look at pieces of printed test patterns in various colors. You say what number or symbol you see. Your doctor may have you trace the number or symbol using a pointer. How do these tests feel?   There is very little chance of

## 2023-04-17 NOTE — ASSESSMENT & PLAN NOTE
At goal, continue current medications and medication adherence emphasized   Key Anti-Hypertensive Meds          lisinopril (PRINIVIL;ZESTRIL) 5 MG tablet (Taking)    Sig - Route:  Take 1 tablet by mouth daily - Oral

## 2023-04-17 NOTE — ASSESSMENT & PLAN NOTE
Asymptomatic, continue current medications   Key Hyperlipidemia Meds          pravastatin (PRAVACHOL) 20 MG tablet (Taking)    Sig - Route:  Take 1 tablet by mouth daily - Oral

## 2023-04-17 NOTE — PROGRESS NOTES
8 units breakfast, 6 units lunch and 7 units supper Yes Rosaura Rhodes MD   fluticasone (FLONASE) 50 MCG/ACT nasal spray 2 sprays by Each Nostril route daily Yes Rosaura Rhodes MD   COMFORT EZ PEN NEEDLES 31G X 8 MM MISC  Yes Historical Provider, MD Fred Harrell 100 UNIT/ML SOPN ADMINISTER 34 UNITS UNDER THE SKIN EVERY MORNING Yes Rosaura Rhodes MD   Continuous Blood Gluc Sensor (DEXCOM G6 SENSOR) MISC Change sensor every 10 days Yes Rosaura Rhodes MD       CareTeam (Including outside providers/suppliers regularly involved in providing care):   Patient Care Team:  Rosaura Rhodes MD as PCP - General (Internal Medicine)  Rosaura Rhodes MD as PCP - Empaneled Provider     Reviewed and updated this visit:  Tobacco  Allergies  Meds  Problems  Med Hx  Surg Hx  Soc Hx  Fam Hx               Adrian Correa MD

## 2023-04-18 ENCOUNTER — CARE COORDINATION (OUTPATIENT)
Dept: CARE COORDINATION | Facility: CLINIC | Age: 53
End: 2023-04-18

## 2023-04-18 ENCOUNTER — CLINICAL DOCUMENTATION (OUTPATIENT)
Dept: SPIRITUAL SERVICES | Age: 53
End: 2023-04-18

## 2023-04-18 NOTE — PROGRESS NOTES
Advance Care Planning   Ambulatory ACP Specialist Patient Outreach    Date:  4/18/2023    ACP Specialist:  Caridad Parsons    Outreach call to patient in follow-up to ACP Specialist referral from: Elizabeth Lyles MD    [x] PCP  [] Provider   [] Ambulatory Care Management [] Other     For:                  [x] Advance Directive Assistance              [] Complete Portable DNR order              [] Complete POST/POLST/MOST              [x] Code Status Discussion             [] Discuss Goals of Care             [] Early ACP Decision-Making              [] Other (Specify)    Date Referral Received:4/17/23    Next Step:   [] ACP scheduled conversation  [x] Outreach again in one week               [] Email / Mail [address] [place]  [] Email / Mail Advance Directive   [] Closing referral.  Routing closure to referring provider/staff and to ACP Specialist . [] Closure letter mailed to patient with invitation to contact ACP Specialist if / when ready. [] Other (Specify here): Outreaches:         [x] 1st -  Date:  4/18/23                              Intervention:  [] Spoke with Patient   [x] Left Voice mail [] Email / Mail    [] [employer]in[employer]auft  [] Other [id number]) : Outcomes: Outreach phone call to the patient. Unable to reach, left voicemail message with call back information. Will attempt to follow up in one week. [] 2nd -  Date:                                Intervention:  [] Spoke with Patient  [] Left Voice mail [] Email / Mail    [] [employer]  [] Other [id number]) : Outcomes:                [] 3rd -  Date:                               Intervention:  [] Spoke with Patient   [] Left Voice mail [] Email / Mail    [] [employer]in[employer]ft  [] Other [id number]) : Outcomes:           []  Additional Outreach -  Date:     (Specify Dates & special circumstances): Outcomes:          Thank you for this referral.

## 2023-04-19 NOTE — PROGRESS NOTES
[facility]  [address] [place] [id number]  Dept: [phone]      Occupational Therapy Daily Note      Referring MD: Niki Zhu MD    Diagnosis:     ICD-10-CM    1. Dupuytren's contracture of right hand  M72.0       2. Finger pain, right  M79.644       3.  Swelling of finger, right  M79.89            History of injury/onset: Chronic history of Dupuytrens contracture     Total Direct Treatment Time: 30 min                       Total In Office Time: 45 min    Preferred Name:  Sonia Floyd HISTORY     PMHX & Meds:   Past Medical History:   Diagnosis Date    Anxiety     Depression     Diabetes ([facility]     High cholesterol     Hypertension    ,   Past Surgical History:   Procedure Laterality Date    APPENDECTOMY        Medications. : Reviewed in chart  Allergies: No Known Allergies       Xiaflex injection: 4/11/23   Xiaflex manipulation:  4/14/23    Therapy precautions: None     Avoid heavy gripping     SUBJECTIVE     Pt reports he has been wearing his splint all the time, states he also has been doing his exercises as part of his HEP     OBJECTIVE     Functional Outcome Measures: Quick Dash  27 score=   36 % functional deficit  Hand/Side Dominance: right handed  Observation/Posture: Holding UE in protected position  Palpation: medium end feel with passive stretching into extension   Swelling/Edema: moderate SF/RF right hand =  Skin Integrity:  skin tear present (~1cm width)       A/PROM MEASUREMENTS    Screenings: Thumb screened- Full flexion to base of little finger      Digital AROM:  IE RF SF RF 4/20/23 SF  4/20/23   AROM    MCP -15°  0°  -28/70 0/62   AROM      PIP -45°  -40°  -30/80 -43/84          TREATMENT PROVIDED:    Therapeutic exercise (23251) x 15 min:  PROM RF/SF ext/flexion due to stiffness noted in both fingers  Education and review of all exercises    Patient I with program following instruction and performance    Orthotic Management and

## 2023-04-20 ENCOUNTER — TREATMENT (OUTPATIENT)
Age: 53
End: 2023-04-20

## 2023-04-20 DIAGNOSIS — M79.89 SWELLING OF FINGER, RIGHT: ICD-10-CM

## 2023-04-20 DIAGNOSIS — M79.644 FINGER PAIN, RIGHT: ICD-10-CM

## 2023-04-20 DIAGNOSIS — M72.0 DUPUYTREN'S CONTRACTURE OF RIGHT HAND: Primary | ICD-10-CM

## 2023-04-21 ENCOUNTER — CARE COORDINATION (OUTPATIENT)
Dept: CARE COORDINATION | Facility: CLINIC | Age: 53
End: 2023-04-21

## 2023-04-21 NOTE — CARE COORDINATION
JAE CM's third and final attempt to contact pt. Unable to lv msg on pt's mobile phone . Voice mail box is full. Left msg for call back on brother, Silvano Markham', phone. Mr. Corbin Cobb is on consent form for contact.     Cc: Dr. Xu Castillo

## 2023-04-24 ENCOUNTER — CARE COORDINATION (OUTPATIENT)
Dept: CARE COORDINATION | Facility: CLINIC | Age: 53
End: 2023-04-24

## 2023-04-24 NOTE — CARE COORDINATION
Inc call from pt. Ref from PCP. Pt needs dental procedures but can't afford them. Pt has both [facility] Medicare and Medicaid. Medicaid does cover the cost of some dental procedures. .  JAE SAAVEDRA provided contact #s for [address] [id number] and [facility] on [address]. Both accept Medicaid. Pt will call    JAE SAAVEDRA follow up in 2 weeks.     Cc: Dr Marlo Garg

## 2023-04-25 ENCOUNTER — CLINICAL DOCUMENTATION (OUTPATIENT)
Dept: SPIRITUAL SERVICES | Age: 53
End: 2023-04-25

## 2023-05-03 ENCOUNTER — APPOINTMENT (OUTPATIENT)
Dept: GENERAL RADIOLOGY | Age: 53
End: 2023-05-03
Payer: MEDICARE

## 2023-05-03 ENCOUNTER — HOSPITAL ENCOUNTER (EMERGENCY)
Age: 53
Discharge: HOME | End: 2023-05-03
Attending: EMERGENCY MEDICINE
Payer: MEDICARE

## 2023-05-03 VITALS
TEMPERATURE: 98.5 F | OXYGEN SATURATION: 98 % | WEIGHT: 163 LBS | HEART RATE: 94 BPM | DIASTOLIC BLOOD PRESSURE: 73 MMHG | HEIGHT: 70 IN | SYSTOLIC BLOOD PRESSURE: 121 MMHG | RESPIRATION RATE: 17 BRPM | BODY MASS INDEX: 23.34 KG/M2

## 2023-05-03 DIAGNOSIS — S46.912A STRAIN OF LEFT SHOULDER, INITIAL ENCOUNTER: Primary | ICD-10-CM

## 2023-05-03 PROCEDURE — 73030 X-RAY EXAM OF SHOULDER: CPT

## 2023-05-03 PROCEDURE — 99283 EMERGENCY DEPT VISIT LOW MDM: CPT

## 2023-05-03 RX ORDER — CYCLOBENZAPRINE HCL 5 MG
5 TABLET ORAL 2 TIMES DAILY PRN
Qty: 15 TABLET | Refills: 0 | Status: SHIPPED | OUTPATIENT
Start: 2023-05-03 | End: 2023-05-13

## 2023-05-03 RX ORDER — TRAMADOL HYDROCHLORIDE 50 MG/1
50 TABLET ORAL EVERY 6 HOURS PRN
Qty: 8 TABLET | Refills: 0 | Status: SHIPPED | OUTPATIENT
Start: 2023-05-03 | End: 2023-05-06

## 2023-05-03 RX ORDER — IBUPROFEN 800 MG/1
800 TABLET ORAL EVERY 8 HOURS PRN
Qty: 21 TABLET | Refills: 0 | Status: SHIPPED | OUTPATIENT
Start: 2023-05-03

## 2023-05-03 ASSESSMENT — PAIN DESCRIPTION - LOCATION: LOCATION: SHOULDER

## 2023-05-03 ASSESSMENT — PAIN DESCRIPTION - ORIENTATION: ORIENTATION: LEFT

## 2023-05-03 ASSESSMENT — PAIN SCALES - GENERAL: PAINLEVEL_OUTOF10: 7

## 2023-05-03 ASSESSMENT — PAIN - FUNCTIONAL ASSESSMENT: PAIN_FUNCTIONAL_ASSESSMENT: 0-10

## 2023-05-04 ASSESSMENT — ENCOUNTER SYMPTOMS
VOMITING: 0
NAUSEA: 0
ABDOMINAL PAIN: 0
BACK PAIN: 0

## 2023-05-04 NOTE — ED PROVIDER NOTES
R-1Normal      lisinopril (PRINIVIL;ZESTRIL) 5 MG tablet Take 1 tablet by mouth daily, Disp-90 tablet, R-1Normal      pravastatin (PRAVACHOL) 20 MG tablet Take 1 tablet by mouth daily, Disp-90 tablet, R-1Normal      insulin lispro, 1 Unit Dial, (HUMALOG/ADMELOG) 100 UNIT/ML SOPN Inject 8 units breakfast, 6 units lunch and 7 units supper, Disp-15 Adjustable Dose Pre-filled Pen Syringe, R-1Normal      fluticasone (FLONASE) 50 MCG/ACT nasal spray 2 sprays by Each Nostril route daily, Disp-48 g, R-1Normal      COMFORT EZ PEN NEEDLES 31G X 8 MM MISC Starting Tue 1/24/2023, PAULA, Historical Med      TRESIBA FLEXTOUCH 100 UNIT/ML SOPN ADMINISTER 34 UNITS UNDER THE SKIN EVERY MORNING, Disp-15 Adjustable Dose Pre-filled Pen Syringe, R-1, DAWNormal      Continuous Blood Gluc Sensor (DEXCOM G6 SENSOR) MISC Change sensor every 10 days, Disp-9 each, R-1Normal              Results for orders placed or performed during the hospital encounter of 05/03/23   XR SHOULDER LEFT (MIN 2 VIEWS)    Narrative    EXAM: Left shoulder radiographs    DATE: May 3, 2023    HISTORY: Left shoulder pain    COMPARISON: None available    TECHNIQUE: 3 radiographs are obtained of the left shoulder    FINDINGS:    The humeral head is well-seated within the bony glenoid. There are mild   degenerative changes involving the glenohumeral articulation. The left AC joint   is intact. There is no acute fracture or dislocation. Impression    1. No acute osseous abnormality. Seth Brice M.D.   5/3/2023 11:07:00 PM        XR SHOULDER LEFT (MIN 2 VIEWS)   Final Result   1. No acute osseous abnormality. Seth Brice M.D.    5/3/2023 11:07:00 PM                        Voice dictation software was used during the making of this note. This software is not perfect and grammatical and other typographical errors may be present. This note has not been completely proofread for errors.      Genie Perez MD  05/04/23 [id number]

## 2023-05-04 NOTE — ED NOTES
I have reviewed discharge instructions with the patient. The patient verbalized understanding. Patient left ED via Discharge Method: ambulatory to Home with self  Opportunity for questions and clarification provided. Patient given 3 scripts. To continue your aftercare when you leave the hospital, you may receive an automated call from our care team to check in on how you are doing. This is a free service and part of our promise to provide the best care and service to meet your aftercare needs.  If you have questions, or wish to unsubscribe from this service please call [phone]. Thank you for Choosing our [facility].         Sonja Arevalo RN  05/03/23 [id number]

## 2023-05-08 NOTE — PROGRESS NOTES
[facility]  [address] [place] [id number]  Dept: [phone]      Occupational Therapy Daily Note      Referring MD: Herbert Lentz MD    Diagnosis:     ICD-10-CM    1. Dupuytren's contracture of right hand  M72.0       2. Finger pain, right  M79.644       3. Swelling of finger, right  M79.89            History of injury/onset: Chronic history of Dupuytrens contracture     Total Direct Treatment Time: 30 min                       Total In Office Time: 45 min    Preferred Name:  Carolin Simms HISTORY     PMHX & Meds:   Past Medical History:   Diagnosis Date    Anxiety     Depression     Diabetes ([address]     High cholesterol     Hypertension    ,   Past Surgical History:   Procedure Laterality Date    APPENDECTOMY        Medications. : Reviewed in chart  Allergies: No Known Allergies       Xiaflex injection: 4/11/23   Xiaflex manipulation:  4/14/23    Therapy precautions: None     Avoid heavy gripping     SUBJECTIVE     Pt reports that he may have injured his left shoulder tripping over a dog last week and has been unable to lift his left arm. He has been to the ED, no other ortho consult. He reports his hand feels okay, he has been wearing his splint.      OBJECTIVE     Functional Outcome Measures: Quick Dash  27 score=   36 % functional deficit  Hand/Side Dominance: right handed  Observation/Posture: Holding UE in protected position  Palpation: medium end feel with passive stretching into extension   Swelling/Edema: moderate SF/RF right hand =  Skin Integrity:  skin tear present (~1cm width)       A/PROM MEASUREMENTS    Screenings: Thumb screened- Full flexion to base of little finger      Digital AROM:  IE RF SF RF 4/20/23 SF  4/20/23 RF  5/9/23 SF  5/9/23   AROM    MCP -15°  0°  -28/70 0/62 0 0   AROM      PIP -45°  -40°  -30/80 -43/84 -30/75 -40/68        TREATMENT PROVIDED:    Therapeutic exercise ([address]) x 30 min:  Moist hot pack to right hand   PROM

## 2023-05-09 ENCOUNTER — TREATMENT (OUTPATIENT)
Age: 53
End: 2023-05-09
Payer: MEDICARE

## 2023-05-09 ENCOUNTER — CARE COORDINATION (OUTPATIENT)
Dept: CARE COORDINATION | Facility: CLINIC | Age: 53
End: 2023-05-09

## 2023-05-09 DIAGNOSIS — M79.644 FINGER PAIN, RIGHT: ICD-10-CM

## 2023-05-09 DIAGNOSIS — M79.89 SWELLING OF FINGER, RIGHT: ICD-10-CM

## 2023-05-09 DIAGNOSIS — M72.0 DUPUYTREN'S CONTRACTURE OF RIGHT HAND: Primary | ICD-10-CM

## 2023-05-09 PROCEDURE — 97110 THERAPEUTIC EXERCISES: CPT | Performed by: OCCUPATIONAL THERAPIST

## 2023-05-09 NOTE — CARE COORDINATION
JAE SAAVEDRA attempted follow up outreach with pt. Unable to reach. Voice mail box is full  JAE SAAVEDRA will be out of the office May 10-23. Will follow up with pt upon return.

## 2023-05-22 ENCOUNTER — OFFICE VISIT (OUTPATIENT)
Dept: INTERNAL MEDICINE CLINIC | Facility: CLINIC | Age: 53
End: 2023-05-22
Payer: MEDICARE

## 2023-05-22 VITALS
TEMPERATURE: 96.7 F | BODY MASS INDEX: 22.48 KG/M2 | OXYGEN SATURATION: 99 % | RESPIRATION RATE: 16 BRPM | SYSTOLIC BLOOD PRESSURE: 94 MMHG | DIASTOLIC BLOOD PRESSURE: 62 MMHG | HEIGHT: 70 IN | WEIGHT: 157 LBS | HEART RATE: 67 BPM

## 2023-05-22 DIAGNOSIS — M25.512 ACUTE PAIN OF LEFT SHOULDER: Primary | ICD-10-CM

## 2023-05-22 PROCEDURE — 3017F COLORECTAL CA SCREEN DOC REV: CPT | Performed by: INTERNAL MEDICINE

## 2023-05-22 PROCEDURE — G8427 DOCREV CUR MEDS BY ELIG CLIN: HCPCS | Performed by: INTERNAL MEDICINE

## 2023-05-22 PROCEDURE — 3078F DIAST BP <80 MM HG: CPT | Performed by: INTERNAL MEDICINE

## 2023-05-22 PROCEDURE — 3074F SYST BP LT 130 MM HG: CPT | Performed by: INTERNAL MEDICINE

## 2023-05-22 PROCEDURE — 99214 OFFICE O/P EST MOD 30 MIN: CPT | Performed by: INTERNAL MEDICINE

## 2023-05-22 PROCEDURE — G8420 CALC BMI NORM PARAMETERS: HCPCS | Performed by: INTERNAL MEDICINE

## 2023-05-22 PROCEDURE — 1036F TOBACCO NON-USER: CPT | Performed by: INTERNAL MEDICINE

## 2023-05-22 ASSESSMENT — PATIENT HEALTH QUESTIONNAIRE - PHQ9
SUM OF ALL RESPONSES TO PHQ QUESTIONS 1-9: 0
SUM OF ALL RESPONSES TO PHQ9 QUESTIONS 1 & 2: 0
1. LITTLE INTEREST OR PLEASURE IN DOING THINGS: 0
SUM OF ALL RESPONSES TO PHQ QUESTIONS 1-9: 0
2. FEELING DOWN, DEPRESSED OR HOPELESS: 0
SUM OF ALL RESPONSES TO PHQ QUESTIONS 1-9: 0
SUM OF ALL RESPONSES TO PHQ QUESTIONS 1-9: 0

## 2023-05-22 ASSESSMENT — ENCOUNTER SYMPTOMS
APNEA: 0
CHEST TIGHTNESS: 0
BACK PAIN: 0

## 2023-05-30 ENCOUNTER — CARE COORDINATION (OUTPATIENT)
Dept: CARE COORDINATION | Facility: CLINIC | Age: 53
End: 2023-05-30

## 2023-05-30 NOTE — CARE COORDINATION
JAE SAAVEDRA outreach with pt. He has contact #s for  [address] [id number] and [facility] on [address]. Both accept Medicaid. No other needs noted. Pt has JAE SAAVEDRA's contact # if future needs arise. Case will be closed at this time.        Cc: Dr Mikel Frazier

## 2023-06-01 ENCOUNTER — CLINICAL DOCUMENTATION (OUTPATIENT)
Dept: CARE COORDINATION | Facility: CLINIC | Age: 53
End: 2023-06-01

## 2023-06-01 NOTE — ACP (ADVANCE CARE PLANNING)
Advance Care Planning   Ambulatory ACP Specialist Patient Outreach    Date:  6/1/2023  ACP Specialist:  Isa Sánchez    Outreach call to patient in follow-up to ACP Specialist referral from: Trish Tay MD    [x] PCP  [] Provider   [] Ambulatory Care Management [] Other for Reason:    [] Advance Directive Assistance  [] Code Status Discussion  [] Complete Portable DNR Order  [] Discuss Goals of Care  [] Complete POST/MOST  [] Early ACP Decision-Making  [x] Other    Date Referral Received:4/17/2023    Today's Outreach:  [x] First   [] Second  [] Third                               Third outreach made by []  phone  [] email []   Bosideng     Intervention:  [x] Spoke with Patient  [] Left VM requesting return call      Outcome:Called the patient as reminder for 6/1 in office ACP appointment. Patient stated that he had no transportation to appointment and would have to call back to re-schedule. This morning the patients brother calls to confirm purpose of the appointment. He further confirmed that he could not bring the patient to the appointment. Stated that he would speak with his brother and reach out at a later time to re-schedule. He was unsure when they would be reaching out. Will close the referral for now and remain available if requested. Next Step:   [] ACP scheduled conversation  [] Outreach again in one week               [] Email / Mail ACP Info Sheets  [] Email / Mail Advance Directive            [x] Close Referral. Routing closure to referring provider/staff and to ACP Specialist . [] Closure Letter mailed to Patient with Invitation to Contact ACP Specialist if/when ready.     Thank you for this referral.

## 2023-06-08 ENCOUNTER — OFFICE VISIT (OUTPATIENT)
Dept: ORTHOPEDIC SURGERY | Age: 53
End: 2023-06-08

## 2023-06-08 DIAGNOSIS — M75.02 ADHESIVE CAPSULITIS OF LEFT SHOULDER: Primary | ICD-10-CM

## 2023-06-08 RX ORDER — METHYLPREDNISOLONE ACETATE 40 MG/ML
40 INJECTION, SUSPENSION INTRA-ARTICULAR; INTRALESIONAL; INTRAMUSCULAR; SOFT TISSUE ONCE
Status: COMPLETED | OUTPATIENT
Start: 2023-06-08 | End: 2023-06-08

## 2023-06-08 RX ADMIN — METHYLPREDNISOLONE ACETATE 40 MG: 40 INJECTION, SUSPENSION INTRA-ARTICULAR; INTRALESIONAL; INTRAMUSCULAR; SOFT TISSUE at 10:45

## 2023-06-08 NOTE — PROGRESS NOTES
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 No osseous lesion. No fracture. Type 1 acromion without osteophytosis. No effusion. Impression: Mild AC joint and GH joint arthritis, no acute abnormality. I independently interpreted XR ordered by a different physician, taken from an outside facility    ASSESSMENT/PLAN:   1. Adhesive capsulitis of left shoulder         Given his acute injury, his symptoms may be secondary to a rotator cuff injury, but he has notable pain and limited ROM suggestive of adhesive capsulitis. He is a type 1 diabetic. I described the pathology of adhesive capsulitis as well as rotator cuff injury, typical course of treatment, and various treatment options. Plan:     We discussed left glenohumeral joint injection today, risks and benefits of injection including pain with injection, bleeding, damage to surrounding structures, infection, elevation in blood glucose, steroid flare. They wished to proceed with injection today and this was performed. I have referred him to PT for a physical therapy program for the next 6 weeks. He can continue oral NSAIDs prn. Orders / medications today:   Orders Placed This Encounter    US ARTHR/ASP/INJ MAJOR JNT/BURSA LEFT     Standing Status:   Future     Number of Occurrences:   1     Standing Expiration Date:   6/8/2024    Ambulatory referral to Physical Therapy     Referral Priority:   Routine     Referral Type:   Eval and Treat     Referral Reason:   Physical Therapy     Requested Specialty:   Physical Therapist     Number of Visits Requested:   1    methylPREDNISolone acetate (DEPO-MEDROL) injection 40 mg      Follow up: Return in about 8 weeks (around 8/3/2023). The patient expressed understanding and agreed with the plan.      Mohsen Viera MD   [facility]thopaed[facility] and [facility]     This document was created using voice

## 2023-07-03 DIAGNOSIS — E10.69 TYPE 1 DIABETES MELLITUS WITH OTHER SPECIFIED COMPLICATION (HCC): ICD-10-CM

## 2023-07-03 RX ORDER — PROCHLORPERAZINE 25 MG/1
SUPPOSITORY RECTAL
Qty: 9 EACH | Refills: 1 | Status: SHIPPED | OUTPATIENT
Start: 2023-07-03

## 2023-07-03 NOTE — TELEPHONE ENCOUNTER
Requested Prescriptions     Pending Prescriptions Disp Refills    Continuous Blood Gluc Sensor (DEXCOM G6 SENSOR) MISC [Pharmacy Med Name: Miller Greene SENSOR SENSOR MISC] 9 each 1     Sig: CHANGE SENSOR EVERY 10 DAYS

## 2023-07-10 DIAGNOSIS — F32.89 OTHER DEPRESSION: ICD-10-CM

## 2023-07-10 DIAGNOSIS — J30.2 SEASONAL ALLERGIES: ICD-10-CM

## 2023-07-10 DIAGNOSIS — F41.9 ANXIETY: ICD-10-CM

## 2023-07-10 DIAGNOSIS — E78.2 MIXED HYPERLIPIDEMIA: ICD-10-CM

## 2023-07-10 DIAGNOSIS — I10 PRIMARY HYPERTENSION: ICD-10-CM

## 2023-07-10 RX ORDER — CITALOPRAM 10 MG/1
10 TABLET ORAL DAILY
Qty: 90 TABLET | Refills: 0 | Status: SHIPPED | OUTPATIENT
Start: 2023-07-10 | End: 2024-07-09

## 2023-07-10 RX ORDER — CETIRIZINE HYDROCHLORIDE 10 MG/1
10 TABLET ORAL DAILY
Qty: 90 TABLET | Refills: 0 | Status: SHIPPED | OUTPATIENT
Start: 2023-07-10

## 2023-07-10 RX ORDER — PRAVASTATIN SODIUM 20 MG
20 TABLET ORAL DAILY
Qty: 90 TABLET | Refills: 0 | Status: SHIPPED | OUTPATIENT
Start: 2023-07-10

## 2023-07-10 RX ORDER — LISINOPRIL 5 MG/1
5 TABLET ORAL DAILY
Qty: 90 TABLET | Refills: 0 | Status: SHIPPED | OUTPATIENT
Start: 2023-07-10 | End: 2024-07-09

## 2023-07-12 ENCOUNTER — TELEPHONE (OUTPATIENT)
Dept: INTERNAL MEDICINE CLINIC | Facility: CLINIC | Age: 53
End: 2023-07-12

## 2023-07-12 NOTE — TELEPHONE ENCOUNTER
This prescription was sent to [facility] on 07/03/2023 by Dr. Bennett Medina ( since Dr. Iris Ly is out of the office.)

## 2023-07-12 NOTE — TELEPHONE ENCOUNTER
Patient requesting Continuous Blood Gluc Sensor (DEXCOM G6 SENSOR) MISC to [address] in [place].  Thanks

## 2023-07-14 ENCOUNTER — TELEPHONE (OUTPATIENT)
Age: 53
End: 2023-07-14

## 2023-07-14 NOTE — TELEPHONE ENCOUNTER
Patient was disconnected. Returned call x 2 and VM full. Called home phone, also full VM.   Send staff message for staff to try and follow up to schedule,

## 2023-07-14 NOTE — TELEPHONE ENCOUNTER
Patient answered and did not desire any appointment for his finger. He was inquiring about his shoulder. Confirmed august3 appt [facility] Russell Viera.

## 2023-07-17 DIAGNOSIS — E10.69 TYPE 1 DIABETES MELLITUS WITH OTHER SPECIFIED COMPLICATION (HCC): ICD-10-CM

## 2023-07-17 DIAGNOSIS — E10.69 TYPE 1 DIABETES MELLITUS WITH OTHER SPECIFIED COMPLICATION (HCC): Primary | ICD-10-CM

## 2023-07-17 RX ORDER — PROCHLORPERAZINE 25 MG/1
SUPPOSITORY RECTAL
Qty: 9 EACH | Refills: 1 | OUTPATIENT
Start: 2023-07-17

## 2023-07-17 RX ORDER — PROCHLORPERAZINE 25 MG/1
SUPPOSITORY RECTAL
COMMUNITY
End: 2023-07-17 | Stop reason: SDUPTHER

## 2023-07-17 RX ORDER — PROCHLORPERAZINE 25 MG/1
1 SUPPOSITORY RECTAL DAILY PRN
Qty: 2 EACH | Refills: 0 | Status: SHIPPED | OUTPATIENT
Start: 2023-07-17

## 2023-07-17 NOTE — TELEPHONE ENCOUNTER
Pt needs new transmitter.       Requested Prescriptions     Pending Prescriptions Disp Refills    Continuous Blood Gluc Transmit (DEXCOM G6 TRANSMITTER) MISC 2 each 0     Si each by Does not apply route daily as needed (as needed for blood sugars)

## 2023-08-02 NOTE — PROGRESS NOTES
Name: Steve Harris  YOB: 1970  Gender: male  MRN: [id number]  Date of Encounter:  8/3/2023       CHIEF COMPLAINT:     Chief Complaint   Patient presents with    Follow-up     Left Shoulder        SUBJECTIVE/OBJECTIVE:      HPI:    Patient is a 46 y.o. pleasant male who presents today for a return evaluation of his left shoulder. Working diagnosis:   1. Adhesive capsulitis of left shoulder       LOV: 6/8/2023     Joan Kelly has had left shoulder pain after tripping over his dog and falling. His motion was significantly decreased, more suggestive of adhesive capsulitis. Recommended physical therapy and perform glenohumeral joint injection 6/8/2023. He has not done any therapy as advised. The injection helped his pain for a few days. He continues to have a lot of shoulder pain and decreased ROM. PAST HISTORY:   Past medical, surgical, family, social history and allergies reviewed by me. Unchanged from prior visit. REVIEW OF SYSTEMS:   As noted in HPI. PHYSICAL EXAMINATION:     Gen: Well-developed, no acute distress   HEENT: NC/AT, EOMI   Neck: Trachea midline, normal ROM   CV: Regular rhythm by palpation of distal pulse, normal capillary refill   Pulm: No respiratory distress, no stridor   Psychiatric: Well oriented, normal mood and affect. Skin: No rashes, lesions or ulcers, normal temperature, turgor, and texture on uninvolved extremity. ORTHO EXAM:    Left Shoulder:     Inspection: No deformity, No erythema  ROM: Abduction and forward flexion to 80. Internal rotation to sacrum. External rotation is significantly limited 10. Tenderness: He has tenderness of the trapezius, infraspinatus fossa, cuff insertion, bicipital groove.    Strength: Abduction 4+/5, External rotation 5/5, Internal rotation 5/5  Provocative tests: Positive Jobes, Holly, Neer, Speeds  Normal capillary refill / 2+ radial pulse   Sensation intact to light touch         DIAGNOSTIC IMAGING:     I

## 2023-08-03 ENCOUNTER — OFFICE VISIT (OUTPATIENT)
Dept: ORTHOPEDIC SURGERY | Age: 53
End: 2023-08-03
Payer: MEDICARE

## 2023-08-03 DIAGNOSIS — M75.02 ADHESIVE CAPSULITIS OF LEFT SHOULDER: Primary | ICD-10-CM

## 2023-08-03 PROCEDURE — 3017F COLORECTAL CA SCREEN DOC REV: CPT | Performed by: STUDENT IN AN ORGANIZED HEALTH CARE EDUCATION/TRAINING PROGRAM

## 2023-08-03 PROCEDURE — 1036F TOBACCO NON-USER: CPT | Performed by: STUDENT IN AN ORGANIZED HEALTH CARE EDUCATION/TRAINING PROGRAM

## 2023-08-03 PROCEDURE — 99214 OFFICE O/P EST MOD 30 MIN: CPT | Performed by: STUDENT IN AN ORGANIZED HEALTH CARE EDUCATION/TRAINING PROGRAM

## 2023-08-03 PROCEDURE — G8428 CUR MEDS NOT DOCUMENT: HCPCS | Performed by: STUDENT IN AN ORGANIZED HEALTH CARE EDUCATION/TRAINING PROGRAM

## 2023-08-03 PROCEDURE — G8420 CALC BMI NORM PARAMETERS: HCPCS | Performed by: STUDENT IN AN ORGANIZED HEALTH CARE EDUCATION/TRAINING PROGRAM

## 2023-08-23 ENCOUNTER — APPOINTMENT (OUTPATIENT)
Dept: GENERAL RADIOLOGY | Age: 53
End: 2023-08-23
Payer: MEDICARE

## 2023-08-23 ENCOUNTER — HOSPITAL ENCOUNTER (EMERGENCY)
Age: 53
Discharge: HOME | End: 2023-08-23
Attending: EMERGENCY MEDICINE
Payer: MEDICARE

## 2023-08-23 VITALS
SYSTOLIC BLOOD PRESSURE: 130 MMHG | WEIGHT: 169 LBS | TEMPERATURE: 98 F | HEIGHT: 70 IN | RESPIRATION RATE: 15 BRPM | DIASTOLIC BLOOD PRESSURE: 72 MMHG | BODY MASS INDEX: 24.2 KG/M2 | OXYGEN SATURATION: 96 % | HEART RATE: 78 BPM

## 2023-08-23 DIAGNOSIS — S80.812A LOWER LEG ABRASION, LEFT, INITIAL ENCOUNTER: Primary | ICD-10-CM

## 2023-08-23 PROCEDURE — 6360000002 HC RX W HCPCS: Performed by: EMERGENCY MEDICINE

## 2023-08-23 PROCEDURE — 90714 TD VACC NO PRESV 7 YRS+ IM: CPT | Performed by: EMERGENCY MEDICINE

## 2023-08-23 PROCEDURE — 90471 IMMUNIZATION ADMIN: CPT | Performed by: EMERGENCY MEDICINE

## 2023-08-23 PROCEDURE — 99284 EMERGENCY DEPT VISIT MOD MDM: CPT

## 2023-08-23 PROCEDURE — 73590 X-RAY EXAM OF LOWER LEG: CPT

## 2023-08-23 RX ADMIN — CLOSTRIDIUM TETANI TOXOID ANTIGEN (FORMALDEHYDE INACTIVATED) AND CORYNEBACTERIUM DIPHTHERIAE TOXOID ANTIGEN (FORMALDEHYDE INACTIVATED) 0.5 ML: 5; 2 INJECTION, SUSPENSION INTRAMUSCULAR at 21:44

## 2023-08-23 ASSESSMENT — PAIN - FUNCTIONAL ASSESSMENT: PAIN_FUNCTIONAL_ASSESSMENT: 0-10

## 2023-08-23 ASSESSMENT — PAIN SCALES - GENERAL: PAINLEVEL_OUTOF10: 5

## 2023-08-24 NOTE — ED TRIAGE NOTES
Pt ambulatory to room. C/o left lower leg pain after hitting leg on the vacuum 2 days ago. Noted redness and abrasions to left leg. Hx T1DM.

## 2023-08-24 NOTE — DISCHARGE INSTRUCTIONS
Please keep the area clean with soap and water and apply an antibacterial ointment and clean bandage twice daily    Please return with any fevers, increased redness, swelling, worsening symptoms, or additional concerns. Follow-up with your primary care doctor in 4 to 5 days for reevaluation.

## 2023-09-11 NOTE — PROGRESS NOTES
rotation 5/5  Provocative tests: Positive Jobes, Holly, Neer, Speeds  Normal capillary refill / 2+ radial pulse   Sensation intact to light touch    DIAGNOSTIC IMAGING:     MRI of the left shoulder reviewed:     Mild AC joint arthritis with edema. [address] [facility] [place] joint cartilage with mild thinning no discrete defect. There is thickening of the axillary pouch and rotator cuff interval consistent with adhesive capsulitis. Supraspinatus tendinosis. There is a small full thickness tear of the anterior tendon fibers. Infraspinatus tendinosis, without tear. Subscapularis is intact. Teres minor is intact. ASSESSMENT/PLAN:   1. Adhesive capsulitis of left shoulder         We discussed MRI findings showing adhesive capsulitis, and rotator cuff tear. We discussed this pathology, disease course, various treatment options, and prognosis. We discussed that the rotator cuff tearing is not likely to heal, however, given his exam and findings consistent with [facility], I do not feel that surgery is the next best approach for him at this time. We discussed that [facility] is a self-limiting but chronic condition that can take months to years to improve. We discussed various methods of management including injections, oral and topical pain medications, physical therapy treatments, and surgery. I advise that prior to any surgery I would recommend injections and therapy. We previously performed GHJ injection that gave him only days of relief. I would not recommend repeating that today. Oral NSAIDs and tylenol advised for pain. Physical therapy referral was ordered today and patient given PT resources. We will reevaluate in 2 months. Orders / medications today:   Orders Placed This Encounter    Ambulatory referral to Physical Therapy     Referral Priority:   Routine     Referral Type:   Eval and Treat     Referral Reason:   Patient Preference     Number of Visits Requested:   1      Follow up: Return in about 2 months (around 11/12/2023).

## 2023-09-12 ENCOUNTER — OFFICE VISIT (OUTPATIENT)
Dept: ORTHOPEDIC SURGERY | Age: 53
End: 2023-09-12
Payer: MEDICARE

## 2023-09-12 DIAGNOSIS — M75.02 ADHESIVE CAPSULITIS OF LEFT SHOULDER: Primary | ICD-10-CM

## 2023-09-12 PROCEDURE — 99213 OFFICE O/P EST LOW 20 MIN: CPT | Performed by: STUDENT IN AN ORGANIZED HEALTH CARE EDUCATION/TRAINING PROGRAM

## 2023-09-12 PROCEDURE — G8428 CUR MEDS NOT DOCUMENT: HCPCS | Performed by: STUDENT IN AN ORGANIZED HEALTH CARE EDUCATION/TRAINING PROGRAM

## 2023-09-12 PROCEDURE — 1036F TOBACCO NON-USER: CPT | Performed by: STUDENT IN AN ORGANIZED HEALTH CARE EDUCATION/TRAINING PROGRAM

## 2023-09-12 PROCEDURE — G8420 CALC BMI NORM PARAMETERS: HCPCS | Performed by: STUDENT IN AN ORGANIZED HEALTH CARE EDUCATION/TRAINING PROGRAM

## 2023-09-12 PROCEDURE — 3017F COLORECTAL CA SCREEN DOC REV: CPT | Performed by: STUDENT IN AN ORGANIZED HEALTH CARE EDUCATION/TRAINING PROGRAM

## 2023-09-22 ENCOUNTER — HOSPITAL ENCOUNTER (OUTPATIENT)
Dept: PHYSICAL THERAPY | Age: 53
Setting detail: RECURRING SERIES
Discharge: HOME | End: 2023-09-25
Attending: STUDENT IN AN ORGANIZED HEALTH CARE EDUCATION/TRAINING PROGRAM
Payer: MEDICARE

## 2023-09-22 DIAGNOSIS — M25.611 STIFFNESS OF RIGHT SHOULDER, NOT ELSEWHERE CLASSIFIED: ICD-10-CM

## 2023-09-22 DIAGNOSIS — M25.612 STIFFNESS OF LEFT SHOULDER, NOT ELSEWHERE CLASSIFIED: ICD-10-CM

## 2023-09-22 DIAGNOSIS — M25.512 LEFT SHOULDER PAIN, UNSPECIFIED CHRONICITY: Primary | ICD-10-CM

## 2023-09-22 PROCEDURE — 97110 THERAPEUTIC EXERCISES: CPT

## 2023-09-22 PROCEDURE — 97161 PT EVAL LOW COMPLEX 20 MIN: CPT

## 2023-09-22 ASSESSMENT — PAIN SCALES - GENERAL: PAINLEVEL_OUTOF10: 7

## 2023-09-22 NOTE — THERAPY EVALUATION
Modalities; Therapeutic activities       Goals: (Goals have been discussed and agreed upon with patient.)  Short-Term Functional Goals: Time Frame: 4 weeks  Pt will improve L shoulder flexion AROM by at least 10 degrees to promote ability to raise arm overhead for ADLs (bathing, dressing)  Pt will reach behind his back to the level of L5 with his L hand to improve his ability to wash his back. Discharge Goals: Time Frame: 8 weeks  Pt will demonstrate L shoulder flexion AROM within 5 degrees of R shoulder to promote return to full mobility and improve his ability to perform overhead tasks. Pt will carry at least 20 lbs in each hand over at least 150 feet to demonstrate improved ability to carry household items, such as groceries. Pt will press at least 10 lbs from chest to overhead to demonstrate improved functional strength and ability to lift items into cabinets. Outcome Measure: Tool Used: Disabilities of the Arm, Shoulder and Hand (DASH) Questionnaire - Quick Version  Score:  Initial: 28/55  Most Recent: X/55 (Date: -- )   Interpretation of Score: The DASH is designed to measure the activities of daily living in person's with upper extremity dysfunction or pain. Each section is scored on a 1-5 scale, 5 representing the greatest disability. The scores of each section are added together for a total score of 55. Medical Necessity:   Skilled intervention continues to be required due to current impairments. Reason For Services/Other Comments:  Patient continues to require skilled intervention due to patient continues to present with impairments assessed at initial evaluation and requiring skilled physical therapy to meet goals for PT. Total Duration:  Time In: 1150  Time Out: 1230    Regarding Vy Ragland's therapy, I certify that the treatment plan above will be carried out by a therapist or under their direction.   Thank you for this referral,  Nicolas Montes De Oca, PT     Referring

## 2023-09-22 NOTE — PROGRESS NOTES
9/29/2023 12:00 PM Kristal Loo, PT [facility]   10/17/2023 12:40 PM Jorden President, MD RASHEL GRAHAM AMB   10/24/2023  1:30 PM MD FRANCISCO Espinosa AMB

## 2023-09-29 ENCOUNTER — HOSPITAL ENCOUNTER (OUTPATIENT)
Dept: PHYSICAL THERAPY | Age: 53
Setting detail: RECURRING SERIES
End: 2023-09-29
Attending: STUDENT IN AN ORGANIZED HEALTH CARE EDUCATION/TRAINING PROGRAM
Payer: MEDICARE

## 2023-09-29 PROCEDURE — 97110 THERAPEUTIC EXERCISES: CPT

## 2023-09-29 NOTE — PROGRESS NOTES
5x10 sec 3 min    Supine cane punch 2x10     Supine flexion AAROM 1x10     UBE  3/3 min forward only    Shoulder extension stretch  2 min    Ring stretch  3 min    Lat pulldowns  3 min    Finger ladder  3 min    Ball rolls  Flexion, ADD and ABD 3 min                    THERAPEUTIC ACTIVITY: ( 0 minutes): Therapeutic activities per grid below to improve mobility, strength, coordination, and dynamic movement to improve functional lifting, carrying, reaching, catching, and overhead activities. Date:  9/29/2023 Date:   Date:     Activity/Exercise Parameters Parameters Parameters                                                 MANUAL THERAPY: (0 minutes):   Joint mobilization, Soft tissue mobilization, and Manipulation was utilized and necessary because of the patient's restricted joint motion, painful spasm, loss of articular motion, and restricted motion of soft tissue. Date  9/29/2023      Technique Used Grade Level # Time(s) Effect while being performed                                                                                         HEP Log Date    counter stretch, ER doorway stretch 9/22/23   2.     3.    4.     5.        POC    Recertification Expiration Date      Plan of Care/Certification Expiration Date: 12/15/23     Visit Count  2    Number of Allowed Visits            Treatment/Session Summary:    >Treatment Assessment: Increased volume of shoulder PROM and AAROM today. Pt still very painful and limited in motion. Continued pt-directed stretching vs therapist.      Communication/Consultation:  faxed initial evaluation to referring provider  Equipment provided today:  HEP handout  Recommendations/Intent for next treatment session: Next visit will focus on shoulder ROM, strengthening as able.     >Total Treatment Billable Duration:  40 minutes  Time In: 1155  Time Out: [address], PT       Charge Capture  }Post Session Pain  PT Visit Info  [employer] Portal  MD Guidelines Scanned Media  Benefits  MyChart    Future Appointments   Date Time Provider [address]  [facility]   10/17/2023 12:40 PM MD RASHEL Rodriguez AMB   10/24/2023  1:30 PM MD FRANCISCO Chavez AMB

## 2023-10-13 ENCOUNTER — HOSPITAL ENCOUNTER (OUTPATIENT)
Dept: PHYSICAL THERAPY | Age: 53
Setting detail: RECURRING SERIES
Discharge: HOME | End: 2023-10-16
Attending: STUDENT IN AN ORGANIZED HEALTH CARE EDUCATION/TRAINING PROGRAM
Payer: MEDICARE

## 2023-10-13 PROCEDURE — 97110 THERAPEUTIC EXERCISES: CPT

## 2023-10-13 NOTE — PROGRESS NOTES
Coby Arriaga  : 1970  Primary: [facility] Medicare Complete (Medicare Managed)  Secondary: Carmen Zhao @ [address] [place] [address] ASHISH ECHEVARRIA  [address] [address]  Phone: [phone]  Fax: [phone] Plan Frequency: 1x/wk, 8-12 weeks (pt unable to meet recommended frequency due to transportation)    Plan of Care/Certification Expiration Date: 12/15/23      >PT Visit Info:  Plan Frequency: 1x/wk, 8-12 weeks (pt unable to meet recommended frequency due to transportation)  Plan of Care/Certification Expiration Date: 12/15/23      Visit Count:  3    OUTPATIENT PHYSICAL THERAPY:OP NOTE TYPE: OP Note Type: Treatment Note 10/13/2023       Episode  }Appt Desk                 Medical/Referring Diagnosis:  Adhesive capsulitis of left shoulder [M75.02]  Referring Physician:  Cooper Rodriguez MD MD Orders:  PT Eval and Treat   Date of Onset:  Onset Date:  (reported 1 month ago)     Allergies:   Patient has no known allergies. Restrictions/Precautions:  Restrictions/Precautions: None  No data recorded   Interventions Planned (Treatment may consist of any combination of the following):    Current Treatment Recommendations: Strengthening; ROM; Manual; Pain management; Home exercise program; Modalities; Therapeutic activities     >Subjective Comments: \"Hardly\" been working on his exercises at home. >Initial:    high  /10>Post Session:       high /10  Medications Last Reviewed:  10/13/2023  Updated Objective Findings:  see initial evaluation  Treatment     THERAPEUTIC EXERCISE: (35 minutes):    Exercises per grid below to improve mobility, strength, balance, and coordination. Progressed resistance and repetitions as indicated.      Date:  2023 Date:  23 Date:  10/13/23   Activity/Exercise Parameters Parameters Parameters   Education Diagnosis, prognosis, POC, HEP, anatomy/physiology of condition       Counter stretch 5x10 sec 3 min 3 min   ER doorway stretch 5x10 sec 3

## 2023-10-14 ASSESSMENT — PATIENT HEALTH QUESTIONNAIRE - PHQ9
2. FEELING DOWN, DEPRESSED OR HOPELESS: NOT AT ALL
4. FEELING TIRED OR HAVING LITTLE ENERGY: 0
5. POOR APPETITE OR OVEREATING: 0
SUM OF ALL RESPONSES TO PHQ QUESTIONS 1-9: 0
1. LITTLE INTEREST OR PLEASURE IN DOING THINGS: NOT AT ALL
6. FEELING BAD ABOUT YOURSELF - OR THAT YOU ARE A FAILURE OR HAVE LET YOURSELF OR YOUR FAMILY DOWN: 0
9. THOUGHTS THAT YOU WOULD BE BETTER OFF DEAD, OR OF HURTING YOURSELF: 0
10. IF YOU CHECKED OFF ANY PROBLEMS, HOW DIFFICULT HAVE THESE PROBLEMS MADE IT FOR YOU TO DO YOUR WORK, TAKE CARE OF THINGS AT HOME, OR GET ALONG WITH OTHER PEOPLE: 0
SUM OF ALL RESPONSES TO PHQ QUESTIONS 1-9: 0
10. IF YOU CHECKED OFF ANY PROBLEMS, HOW DIFFICULT HAVE THESE PROBLEMS MADE IT FOR YOU TO DO YOUR WORK, TAKE CARE OF THINGS AT HOME, OR GET ALONG WITH OTHER PEOPLE: NOT DIFFICULT AT ALL
SUM OF ALL RESPONSES TO PHQ QUESTIONS 1-9: 0
9. THOUGHTS THAT YOU WOULD BE BETTER OFF DEAD, OR OF HURTING YOURSELF: NOT AT ALL
2. FEELING DOWN, DEPRESSED OR HOPELESS: 0
SUM OF ALL RESPONSES TO PHQ QUESTIONS 1-9: 0
7. TROUBLE CONCENTRATING ON THINGS, SUCH AS READING THE NEWSPAPER OR WATCHING TELEVISION: NOT AT ALL
8. MOVING OR SPEAKING SO SLOWLY THAT OTHER PEOPLE COULD HAVE NOTICED. OR THE OPPOSITE, BEING SO FIGETY OR RESTLESS THAT YOU HAVE BEEN MOVING AROUND A LOT MORE THAN USUAL: 0
6. FEELING BAD ABOUT YOURSELF - OR THAT YOU ARE A FAILURE OR HAVE LET YOURSELF OR YOUR FAMILY DOWN: NOT AT ALL
5. POOR APPETITE OR OVEREATING: NOT AT ALL
4. FEELING TIRED OR HAVING LITTLE ENERGY: NOT AT ALL
3. TROUBLE FALLING OR STAYING ASLEEP: 0
1. LITTLE INTEREST OR PLEASURE IN DOING THINGS: 0
SUM OF ALL RESPONSES TO PHQ9 QUESTIONS 1 & 2: 0
SUM OF ALL RESPONSES TO PHQ QUESTIONS 1-9: 0
7. TROUBLE CONCENTRATING ON THINGS, SUCH AS READING THE NEWSPAPER OR WATCHING TELEVISION: 0
8. MOVING OR SPEAKING SO SLOWLY THAT OTHER PEOPLE COULD HAVE NOTICED. OR THE OPPOSITE - BEING SO FIDGETY OR RESTLESS THAT YOU HAVE BEEN MOVING AROUND A LOT MORE THAN USUAL: NOT AT ALL
3. TROUBLE FALLING OR STAYING ASLEEP: NOT AT ALL

## 2023-10-17 ENCOUNTER — OFFICE VISIT (OUTPATIENT)
Dept: INTERNAL MEDICINE CLINIC | Facility: CLINIC | Age: 53
End: 2023-10-17
Payer: MEDICARE

## 2023-10-17 VITALS
WEIGHT: 173.8 LBS | HEIGHT: 70 IN | DIASTOLIC BLOOD PRESSURE: 60 MMHG | SYSTOLIC BLOOD PRESSURE: 120 MMHG | BODY MASS INDEX: 24.88 KG/M2 | HEART RATE: 71 BPM

## 2023-10-17 DIAGNOSIS — M77.8 SHOULDER CAPSULITIS: ICD-10-CM

## 2023-10-17 DIAGNOSIS — E78.2 MIXED HYPERLIPIDEMIA: ICD-10-CM

## 2023-10-17 DIAGNOSIS — I10 PRIMARY HYPERTENSION: ICD-10-CM

## 2023-10-17 DIAGNOSIS — E10.69 TYPE 1 DIABETES MELLITUS WITH OTHER SPECIFIED COMPLICATION (HCC): Primary | ICD-10-CM

## 2023-10-17 DIAGNOSIS — L84 CORNS AND CALLOSITIES: ICD-10-CM

## 2023-10-17 DIAGNOSIS — B35.1 ONYCHOMYCOSIS: ICD-10-CM

## 2023-10-17 DIAGNOSIS — Z23 ENCOUNTER FOR VACCINATION: ICD-10-CM

## 2023-10-17 PROCEDURE — G8420 CALC BMI NORM PARAMETERS: HCPCS | Performed by: INTERNAL MEDICINE

## 2023-10-17 PROCEDURE — 2022F DILAT RTA XM EVC RTNOPTHY: CPT | Performed by: INTERNAL MEDICINE

## 2023-10-17 PROCEDURE — 99214 OFFICE O/P EST MOD 30 MIN: CPT | Performed by: INTERNAL MEDICINE

## 2023-10-17 PROCEDURE — 90674 CCIIV4 VAC NO PRSV 0.5 ML IM: CPT | Performed by: INTERNAL MEDICINE

## 2023-10-17 PROCEDURE — G0008 ADMIN INFLUENZA VIRUS VAC: HCPCS | Performed by: INTERNAL MEDICINE

## 2023-10-17 PROCEDURE — 3074F SYST BP LT 130 MM HG: CPT | Performed by: INTERNAL MEDICINE

## 2023-10-17 PROCEDURE — 1036F TOBACCO NON-USER: CPT | Performed by: INTERNAL MEDICINE

## 2023-10-17 PROCEDURE — 3046F HEMOGLOBIN A1C LEVEL >9.0%: CPT | Performed by: INTERNAL MEDICINE

## 2023-10-17 PROCEDURE — G8482 FLU IMMUNIZE ORDER/ADMIN: HCPCS | Performed by: INTERNAL MEDICINE

## 2023-10-17 PROCEDURE — 3078F DIAST BP <80 MM HG: CPT | Performed by: INTERNAL MEDICINE

## 2023-10-17 PROCEDURE — 3017F COLORECTAL CA SCREEN DOC REV: CPT | Performed by: INTERNAL MEDICINE

## 2023-10-17 PROCEDURE — G8428 CUR MEDS NOT DOCUMENT: HCPCS | Performed by: INTERNAL MEDICINE

## 2023-10-17 ASSESSMENT — ENCOUNTER SYMPTOMS
CHEST TIGHTNESS: 0
COLOR CHANGE: 1
ABDOMINAL DISTENTION: 0
SHORTNESS OF BREATH: 0
WHEEZING: 0
PHOTOPHOBIA: 0

## 2023-10-17 NOTE — PROGRESS NOTES
nutritionist, tips to decrease food intake, carbohydrate intake, he has been advised to communicate with his endocrinologist in regards adjusting the insulin doses, but I am suggesting to increase Tresiba to 25 units, and may need to increase insulin sliding scale. His blood pressure is well controlled with current lisinopril dose,  Patient reports having an appointment coming up with podiatry for his onychomycosis, we discussed about proper foot care  Referral for ophthalmology has been placed,    Return in about 3 months (around 1/17/2024) for diabetes. Review of system:    Review of Systems   Constitutional:  Negative for activity change, fatigue and unexpected weight change. Eyes:  Negative for photophobia and visual disturbance. Respiratory:  Negative for chest tightness, shortness of breath and wheezing. Cardiovascular:  Negative for chest pain, palpitations and leg swelling. Gastrointestinal:  Negative for abdominal distention. Genitourinary:  Negative for difficulty urinating and frequency. Musculoskeletal:  Positive for arthralgias. Skin:  Positive for color change and rash. Neurological:  Negative for dizziness and headaches.          Immunization history:    Immunization History   Administered Date(s) Administered    COVID-19, J&J, (age 18y+), IM, 0.5 mL 08/04/2021, 12/28/2021    Influenza Virus Vaccine 10/19/2018    Influenza, FLUBLOK, (age 25 y+), PF, 0.5mL 11/08/2019, 11/03/2020, 12/22/2021    Influenza, FLUCELVAX, (age 10 mo+), MDCK, PF, 0.5mL 10/17/2023    Influenza, Triv, 3 Years and older, IM (Afluria (5 yrs and older) 10/12/2012    Pneumococcal, PCV-13, PREVNAR 15, (age 6w+), IM, 0.5mL 05/02/2016    Pneumococcal, PPSV23, PNEUMOVAX 23, (age 2y+), SC/IM, 0.5mL 09/09/2015, 10/19/2016    TD 5LF, Judson Gallatin, (age 7y+), IM, 0.5mL 08/23/2023    TDaP, ADACEL (age 6y-58y), [facility] (age 10y+), IM, 0.5mL 05/01/2014, 05/02/2016       Current medications:      Current Outpatient Medications:

## 2023-10-18 ENCOUNTER — TELEPHONE (OUTPATIENT)
Dept: INTERNAL MEDICINE CLINIC | Facility: CLINIC | Age: 53
End: 2023-10-18

## 2023-10-18 NOTE — TELEPHONE ENCOUNTER
Discussed with pt and he got his insulin. He was actually shocked his bs was that high. He said something about seeing the Endo tomorrow.

## 2023-10-18 NOTE — TELEPHONE ENCOUNTER
Please let him know the results of his labs, his blood sugars are in the 600s, with a low sodium, this is most likely what we call send with the hyponatremia, sodium is low because of high blood sugars, he was supposed to get his insulin yesterday from the pharmacy, has not been using the insulin sliding scale as he ran out of medication, please confirm patient that the medication, and asked him to contact his endocrinologist.    Please also ask him if he has a care coordinator ,

## 2023-10-20 ENCOUNTER — HOSPITAL ENCOUNTER (OUTPATIENT)
Dept: PHYSICAL THERAPY | Age: 53
Setting detail: RECURRING SERIES
Discharge: HOME | End: 2023-10-23
Attending: STUDENT IN AN ORGANIZED HEALTH CARE EDUCATION/TRAINING PROGRAM
Payer: MEDICARE

## 2023-10-20 PROCEDURE — 97110 THERAPEUTIC EXERCISES: CPT

## 2023-10-20 NOTE — PROGRESS NOTES
to reduce BG. Pt verbalized understanding and that he's planning on going on walks after meals. Communication/Consultation:  faxed initial evaluation to referring provider  Equipment provided today:  HEP handout  Recommendations/Intent for next treatment session: Next visit will focus on shoulder ROM, strengthening as able.     >Total Treatment Billable Duration:  40 minutes, 10 min unbilled  Time In: 1155  Time Out: [address], PT       Charge Capture  }Post Session Pain  PT Visit Info  [facility] Portal  MD Guidelines  Scanned Media  Benefits  MyChart    Future Appointments   Date Time Provider [address]   10/24/2023  1:30 PM Lincoln Hickey MD [facility]   10/27/2023 12:00 PM Chavo Bradley, PT [facility]   11/3/2023 12:00 PM Jeovanny Herbert, PT [facility]   11/10/2023 12:00 PM Chavo Bradley, PT [facility]   11/17/2023 12:00 PM David Livingston, PT [facility] [place] [facility]

## 2023-10-27 ENCOUNTER — HOSPITAL ENCOUNTER (OUTPATIENT)
Dept: PHYSICAL THERAPY | Age: 53
Setting detail: RECURRING SERIES
Discharge: HOME | End: 2023-10-30
Attending: STUDENT IN AN ORGANIZED HEALTH CARE EDUCATION/TRAINING PROGRAM
Payer: MEDICARE

## 2023-10-27 PROCEDURE — 97110 THERAPEUTIC EXERCISES: CPT

## 2023-10-27 NOTE — PROGRESS NOTES
Chelle Garcia  : 1970  Primary: [facility] Medicare Complete (Medicare Managed)  Secondary: Carmen Zhao @ [address] [place] [address] ASHISH ECHEVARRIA  [address] [address]  Phone: [phone]  Fax: [phone] Plan Frequency: 1x/wk, 8-12 weeks (pt unable to meet recommended frequency due to transportation)    Plan of Care/Certification Expiration Date: 12/15/23      >PT Visit Info:  Plan Frequency: 1x/wk, 8-12 weeks (pt unable to meet recommended frequency due to transportation)  Plan of Care/Certification Expiration Date: 12/15/23      Visit Count:  5    OUTPATIENT PHYSICAL THERAPY:OP NOTE TYPE: OP Note Type: Treatment Note 10/27/2023       Episode  }Appt Desk                 Medical/Referring Diagnosis:  Adhesive capsulitis of left shoulder [M75.02]  Referring Physician:  Berna Jama MD MD Orders:  PT Eval and Treat   Date of Onset:  Onset Date:  (reported 1 month ago)     Allergies:   Patient has no known allergies. Restrictions/Precautions:  Restrictions/Precautions: None  No data recorded   Interventions Planned (Treatment may consist of any combination of the following):    Current Treatment Recommendations: Strengthening; ROM; Manual; Pain management; Home exercise program; Modalities; Therapeutic activities     >Subjective Comments: Been using voltaren and it's felt helpful. Able to work on his exercises \"most everyday\". >Initial:    high  /10>Post Session:       high /10  Medications Last Reviewed:  10/27/2023  Updated Objective Findings:  90 deg shoulder flexion PROM with L stretch  Treatment     THERAPEUTIC EXERCISE: (40 minutes):    Exercises per grid below to improve mobility, strength, balance, and coordination. Progressed resistance and repetitions as indicated.      Date:  2023 Date:  23 Date:  10/13/23 Date  10/20/23 Date  10/27/23   Activity/Exercise Parameters Parameters Parameters     Education Diagnosis, prognosis, POC, HEP,

## 2023-10-31 ENCOUNTER — OFFICE VISIT (OUTPATIENT)
Dept: ORTHOPEDIC SURGERY | Age: 53
End: 2023-10-31
Payer: MEDICARE

## 2023-10-31 DIAGNOSIS — M75.02 ADHESIVE CAPSULITIS OF LEFT SHOULDER: Primary | ICD-10-CM

## 2023-10-31 PROCEDURE — G8482 FLU IMMUNIZE ORDER/ADMIN: HCPCS | Performed by: STUDENT IN AN ORGANIZED HEALTH CARE EDUCATION/TRAINING PROGRAM

## 2023-10-31 PROCEDURE — G8428 CUR MEDS NOT DOCUMENT: HCPCS | Performed by: STUDENT IN AN ORGANIZED HEALTH CARE EDUCATION/TRAINING PROGRAM

## 2023-10-31 PROCEDURE — 99213 OFFICE O/P EST LOW 20 MIN: CPT | Performed by: STUDENT IN AN ORGANIZED HEALTH CARE EDUCATION/TRAINING PROGRAM

## 2023-10-31 PROCEDURE — 1036F TOBACCO NON-USER: CPT | Performed by: STUDENT IN AN ORGANIZED HEALTH CARE EDUCATION/TRAINING PROGRAM

## 2023-10-31 PROCEDURE — G8420 CALC BMI NORM PARAMETERS: HCPCS | Performed by: STUDENT IN AN ORGANIZED HEALTH CARE EDUCATION/TRAINING PROGRAM

## 2023-10-31 PROCEDURE — 3017F COLORECTAL CA SCREEN DOC REV: CPT | Performed by: STUDENT IN AN ORGANIZED HEALTH CARE EDUCATION/TRAINING PROGRAM

## 2023-10-31 NOTE — PROGRESS NOTES
Name: Lo Ellis  YOB: 1970  Gender: male  MRN: [id number]  Date of Encounter:  10/31/2023       CHIEF COMPLAINT:     Chief Complaint   Patient presents with    Follow-up     Left Shoulder        SUBJECTIVE/OBJECTIVE:      HPI:    Patient is a 46 y.o. pleasant male who presents today for a return evaluation of his left shoulder. Working diagnosis:   1. Adhesive capsulitis of left shoulder       LOV: 9/12/2023     Recall hx:   Antonio Bonner has had left shoulder pain after tripping over his dog and falling. His motion was significantly decreased, more suggestive of adhesive capsulitis. Recommended physical therapy and perform glenohumeral joint injection 6/8/2023. He has not done any therapy as advised. The injection helped his pain for a few days. He continues to have a lot of shoulder pain and decreased ROM. He has been very concerned and requested MRI. He has continued shoulder pain and limited mobility. His therapist questions his compliance and has not had good progression with him. PAST HISTORY:   Past medical, surgical, family, social history and allergies reviewed by me. Unchanged from prior visit. REVIEW OF SYSTEMS:   As noted in HPI. PHYSICAL EXAMINATION:     Gen: Well-developed, no acute distress   HEENT: NC/AT, EOMI   Neck: Trachea midline, normal ROM   CV: Regular rhythm by palpation of distal pulse, normal capillary refill   Pulm: No respiratory distress, no stridor   Psychiatric: Well oriented, normal mood and affect. Skin: No rashes, lesions or ulcers, normal temperature, turgor, and texture on uninvolved extremity. ORTHO EXAM:    Left Shoulder:      Inspection: no erythema. No significant effusion. ROM: Abduction and forward flexion to 40 active, with slight increase in motion passively but painful. Internal rotation to sacrum. External rotation is significantly limited 5.    Tenderness: He has tenderness of the trapezius, infraspinatus fossa,

## 2023-11-03 ENCOUNTER — HOSPITAL ENCOUNTER (OUTPATIENT)
Dept: PHYSICAL THERAPY | Age: 53
Setting detail: RECURRING SERIES
Discharge: HOME | End: 2023-11-06
Attending: STUDENT IN AN ORGANIZED HEALTH CARE EDUCATION/TRAINING PROGRAM
Payer: MEDICARE

## 2023-11-03 PROCEDURE — 97110 THERAPEUTIC EXERCISES: CPT

## 2023-11-03 NOTE — PROGRESS NOTES
Yelena Austin  : 1970  Primary: [facility] Medicare Complete (Medicare Managed)  Secondary: Carmen Zhao @ [address] [place] [address] DILIP Pearson [address]  Phone: [phone]  Fax: [phone] Plan Frequency: 1x/wk, 8-12 weeks (pt unable to meet recommended frequency due to transportation)    Plan of Care/Certification Expiration Date: 12/15/23      >PT Visit Info:  Plan Frequency: 1x/wk, 8-12 weeks (pt unable to meet recommended frequency due to transportation)  Plan of Care/Certification Expiration Date: 12/15/23      Visit Count:  6    OUTPATIENT PHYSICAL THERAPY:OP NOTE TYPE: OP Note Type: Treatment Note 11/3/2023       Episode  }Appt Desk                 Medical/Referring Diagnosis:  Adhesive capsulitis of left shoulder [M75.02]  Referring Physician:  Daphne Henriquez MD MD Orders:  PT Eval and Treat   Date of Onset:  Onset Date:  (reported 1 month ago)     Allergies:   Patient has no known allergies. Restrictions/Precautions:  Restrictions/Precautions: None  No data recorded   Interventions Planned (Treatment may consist of any combination of the following):    Current Treatment Recommendations: Strengthening; ROM; Manual; Pain management; Home exercise program; Modalities; Therapeutic activities     >Subjective Comments: pt reports he continues to have trouble raising his arm up.     >Initial:    high  /10>Post Session:       high /10  Medications Last Reviewed:  11/3/2023  Updated Objective Findings:  92 deg shoulder flexion PROM with L stretch  Treatment     THERAPEUTIC EXERCISE: (40 minutes):    Exercises per grid below to improve mobility, strength, balance, and coordination. Progressed resistance and repetitions as indicated.      Date:  2023 Date:  23 Date:  10/13/23 Date  10/20/23 Date  10/27/23 Date:  11/3/23   Activity/Exercise Parameters Parameters Parameters      Education Diagnosis, prognosis, POC, HEP, anatomy/physiology of

## 2023-11-10 ENCOUNTER — HOSPITAL ENCOUNTER (OUTPATIENT)
Dept: PHYSICAL THERAPY | Age: 53
Setting detail: RECURRING SERIES
Discharge: HOME | End: 2023-11-13
Attending: STUDENT IN AN ORGANIZED HEALTH CARE EDUCATION/TRAINING PROGRAM
Payer: MEDICARE

## 2023-11-10 PROCEDURE — 97110 THERAPEUTIC EXERCISES: CPT

## 2023-11-10 NOTE — PROGRESS NOTES
3.    4.     5.        POC    Recertification Expiration Date      Plan of Care/Certification Expiration Date: 12/15/23     Visit Count  7    Number of Allowed Visits            Treatment/Session Summary:    >Treatment Assessment: Progress continues to be limited by decreased frequency of appointments and unknown compliance with HEP due to mental impairments. Continued to reinforce managing BG with activity after meals to improve prognosis on AC. Communication/Consultation:  faxed initial evaluation to referring provider  Equipment provided today:  HEP handout  Recommendations/Intent for next treatment session: Next visit will focus on shoulder ROM, strengthening as able.     >Total Treatment Billable Duration:  45 minutes  Time In: 1155  Time Out: [address], PT       Charge Capture  }Post Session Pain  PT Visit Info  [facility] Portal  MD Guidelines  Scanned Media  Benefits  MyChart    Future Appointments   Date Time Provider [address]   11/17/2023 12:00 PM [facility]   1/2/2024 10:30 AM MD FRANCISCO DiasL AMB

## 2023-11-17 ENCOUNTER — HOSPITAL ENCOUNTER (OUTPATIENT)
Dept: PHYSICAL THERAPY | Age: 53
Setting detail: RECURRING SERIES
Discharge: HOME | End: 2023-11-20
Attending: STUDENT IN AN ORGANIZED HEALTH CARE EDUCATION/TRAINING PROGRAM
Payer: MEDICARE

## 2023-11-17 PROCEDURE — 97110 THERAPEUTIC EXERCISES: CPT

## 2023-11-17 NOTE — THERAPY DISCHARGE
tested    Test/Result           Joint Assessment/Palpation   [] This section not tested    Joint    Increased pain with inferior and posterior joint mobilization     Palpation    NT       Neurological Screen  [x] This section not tested due to no neurological complaints    Test/Result   Dermatomes:       L UE:        R UE:        L LE:        R LE:    Myotomes:       L UE:        R UE:        L LE:        R LE:    Reflexes:       Upper Quarter:        Lower Quarter                  ASSESSMENT   Initial Assessment:  Flakito Alcazar demonstrates minimal improvement in L shoulder ROM since starting therapy ~2 months ago. Pt limited by decreased frequency of appointments due to transportation and decreased compliance with [facility] due to cognitive limitations. At this time, pt is appropriate to discharge to [facility] due to lack of progress. Problem List: (Impacting functional limitations): Body Structures, Functions, Activity Limitations Requiring Skilled Therapeutic Intervention: Decreased functional mobility ; Decreased ADL status; Decreased ROM; Decreased body mechanics; Decreased strength; Increased pain; Decreased posture       Therapy Prognosis:   Therapy Prognosis: Fair       Initial Assessment Complexity:   Decision Making: Low Complexity      PLAN   Effective Dates: 11/17/2023   TO Plan of Care/Certification Expiration Date: 12/15/23     Frequency/Duration: Plan Frequency: 1x/wk, 8-12 weeks (pt unable to meet recommended frequency due to transportation)     Interventions Planned (Treatment may consist of any combination of the following):    Current Treatment Recommendations: Strengthening; ROM; Manual; Pain management; Home exercise program; Modalities;  Therapeutic activities       Goals: (Goals have been discussed and agreed upon with patient.)  Short-Term Functional Goals: Time Frame: 4 weeks  Pt will improve L shoulder flexion AROM by at least 10 degrees to promote ability to raise arm overhead for ADLs

## 2023-11-17 NOTE — PROGRESS NOTES
Flakito Alcazar  : 1970  Primary: [facility] Medicare Complete (Medicare Managed)  Secondary: Carmen Zhao @ [address] [place] [address] ASHISH ECHEVARRIA  [address] [address]  Phone: [phone]  Fax: [phone] Plan Frequency: 1x/wk, 8-12 weeks (pt unable to meet recommended frequency due to transportation)    Plan of Care/Certification Expiration Date: 12/15/23      >PT Visit Info:  Plan Frequency: 1x/wk, 8-12 weeks (pt unable to meet recommended frequency due to transportation)  Plan of Care/Certification Expiration Date: 12/15/23      Visit Count:  8    OUTPATIENT PHYSICAL THERAPY:OP NOTE TYPE: OP Note Type: Treatment Note 2023       Episode  }Appt Desk                 Medical/Referring Diagnosis:  Adhesive capsulitis of left shoulder [M75.02]  Referring Physician:  Nora Lopez MD MD Orders:  PT Eval and Treat   Date of Onset:  Onset Date:  (reported 1 month ago)     Allergies:   Patient has no known allergies. Restrictions/Precautions:  Restrictions/Precautions: None  No data recorded   Interventions Planned (Treatment may consist of any combination of the following):    Current Treatment Recommendations: Strengthening; ROM; Manual; Pain management; Home exercise program; Modalities; Therapeutic activities     >Subjective Comments: See Discharge. BG was really high today (340). Took his insulin ~1 hour prior to therapy and it's come down a little. Feeling fine. >Initial:    high  /10>Post Session:       high /10  Medications Last Reviewed:  2023  Updated Objective Findings:   at start of session  Treatment     THERAPEUTIC EXERCISE: (45 minutes):    Exercises per grid below to improve mobility, strength, balance, and coordination. Progressed resistance and repetitions as indicated.      Date:  2023 Date:  23 Date:  10/13/23 Date  10/20/23 Date  10/27/23 Date:  11/3/23 Date  11/10/23 Date  23   Activity/Exercise Parameters Parameters

## 2023-11-30 DIAGNOSIS — F81.9 DELAY OF COGNITIVE DEVELOPMENT: ICD-10-CM

## 2023-11-30 DIAGNOSIS — E10.69 TYPE 1 DIABETES MELLITUS WITH OTHER SPECIFIED COMPLICATION (HCC): Primary | ICD-10-CM

## 2024-01-30 ENCOUNTER — OFFICE VISIT (OUTPATIENT)
Dept: ORTHOPEDIC SURGERY | Age: 54
End: 2024-01-30
Payer: MEDICARE

## 2024-01-30 DIAGNOSIS — M75.02 ADHESIVE CAPSULITIS OF LEFT SHOULDER: Primary | ICD-10-CM

## 2024-01-30 PROCEDURE — 1036F TOBACCO NON-USER: CPT | Performed by: STUDENT IN AN ORGANIZED HEALTH CARE EDUCATION/TRAINING PROGRAM

## 2024-01-30 PROCEDURE — G8419 CALC BMI OUT NRM PARAM NOF/U: HCPCS | Performed by: STUDENT IN AN ORGANIZED HEALTH CARE EDUCATION/TRAINING PROGRAM

## 2024-01-30 PROCEDURE — 3017F COLORECTAL CA SCREEN DOC REV: CPT | Performed by: STUDENT IN AN ORGANIZED HEALTH CARE EDUCATION/TRAINING PROGRAM

## 2024-01-30 PROCEDURE — 99213 OFFICE O/P EST LOW 20 MIN: CPT | Performed by: STUDENT IN AN ORGANIZED HEALTH CARE EDUCATION/TRAINING PROGRAM

## 2024-01-30 PROCEDURE — G8428 CUR MEDS NOT DOCUMENT: HCPCS | Performed by: STUDENT IN AN ORGANIZED HEALTH CARE EDUCATION/TRAINING PROGRAM

## 2024-01-30 PROCEDURE — G8482 FLU IMMUNIZE ORDER/ADMIN: HCPCS | Performed by: STUDENT IN AN ORGANIZED HEALTH CARE EDUCATION/TRAINING PROGRAM

## 2024-01-30 NOTE — PROGRESS NOTES
Name: Bubba Ragland  YOB: 1970  Gender: male  MRN: [id number]  Date of Encounter:  1/30/2024       CHIEF COMPLAINT:     Chief Complaint   Patient presents with    Follow-up     L Shoulder        SUBJECTIVE/OBJECTIVE:      HPI:    Patient is a 53 y.o. pleasant male who presents today for a return evaluation of his left shoulder.    Working diagnosis:   1. Adhesive capsulitis of left shoulder       LOV: 10/31/2023     Recall hx:   Bubba has had left shoulder pain after tripping over his dog and falling.  His motion was significantly decreased, more suggestive of adhesive capsulitis.  Recommended physical therapy and perform glenohumeral joint injection 6/8/2023. He has not done any therapy as advised. The injection helped his pain for a few days. He continues to have a lot of shoulder pain and decreased ROM. He has been very concerned and requested MRI, which we obtained. MRI confirms adhesive capsulitis. He does have a small full thickness tear of the supraspinatus anteriorly. He didn't progress well in PT and his therapist questioned his compliance .    Bubba has been inconsistent in follow ups, but he returns today reporting improvement in pain and motion. He is working on getting his A1C improved and it is down from 12 to 9 but with recent insurance changes, he has had to change doctors again.      PAST HISTORY:   Past medical, surgical, family, social history and allergies reviewed by me. Unchanged from prior visit.     REVIEW OF SYSTEMS:   As noted in HPI.     PHYSICAL EXAMINATION:     Gen: Well-developed, no acute distress   HEENT: NC/AT, EOMI   Neck: Trachea midline, normal ROM   CV: Regular rhythm by palpation of distal pulse, normal capillary refill   Pulm: No respiratory distress, no stridor   Psychiatric: Well oriented, normal mood and affect.   Skin: No rashes, lesions or ulcers, normal temperature, turgor, and texture on uninvolved extremity.      ORTHO EXAM:    Left

## 2024-01-30 NOTE — PATIENT INSTRUCTIONS
however, even after several years, the motion does not return completely and some degree of stiffness remains. Diabetic patients often have some degree of continued shoulder stiffness after surgery.    Although uncommon, frozen shoulder can recur, especially if a contributing factor like diabetes is still present.

## 2024-03-20 DIAGNOSIS — R05.1 ACUTE COUGH: ICD-10-CM

## 2024-03-20 DIAGNOSIS — R09.81 SINUS CONGESTION: ICD-10-CM

## 2024-03-20 RX ORDER — FLUTICASONE PROPIONATE 50 MCG
2 SPRAY, SUSPENSION (ML) NASAL DAILY
Qty: 48 G | Refills: 1 | Status: SHIPPED | OUTPATIENT
Start: 2024-03-20

## 2024-03-22 ENCOUNTER — OFFICE VISIT (OUTPATIENT)
Dept: ENDOCRINOLOGY | Age: 54
End: 2024-03-22
Payer: MEDICARE

## 2024-03-22 VITALS — DIASTOLIC BLOOD PRESSURE: 80 MMHG | SYSTOLIC BLOOD PRESSURE: 120 MMHG | WEIGHT: 183 LBS | BODY MASS INDEX: 26.26 KG/M2

## 2024-03-22 DIAGNOSIS — F81.9 DELAY OF COGNITIVE DEVELOPMENT: ICD-10-CM

## 2024-03-22 DIAGNOSIS — E10.65 TYPE 1 DIABETES MELLITUS WITH HYPERGLYCEMIA (HCC): Primary | ICD-10-CM

## 2024-03-22 DIAGNOSIS — Z86.39 HISTORY OF KETOACIDOSIS: ICD-10-CM

## 2024-03-22 PROCEDURE — 1036F TOBACCO NON-USER: CPT | Performed by: PHYSICIAN ASSISTANT

## 2024-03-22 PROCEDURE — 3074F SYST BP LT 130 MM HG: CPT | Performed by: PHYSICIAN ASSISTANT

## 2024-03-22 PROCEDURE — 3079F DIAST BP 80-89 MM HG: CPT | Performed by: PHYSICIAN ASSISTANT

## 2024-03-22 PROCEDURE — 3046F HEMOGLOBIN A1C LEVEL >9.0%: CPT | Performed by: PHYSICIAN ASSISTANT

## 2024-03-22 PROCEDURE — G8419 CALC BMI OUT NRM PARAM NOF/U: HCPCS | Performed by: PHYSICIAN ASSISTANT

## 2024-03-22 PROCEDURE — 2022F DILAT RTA XM EVC RTNOPTHY: CPT | Performed by: PHYSICIAN ASSISTANT

## 2024-03-22 PROCEDURE — G8482 FLU IMMUNIZE ORDER/ADMIN: HCPCS | Performed by: PHYSICIAN ASSISTANT

## 2024-03-22 PROCEDURE — 3017F COLORECTAL CA SCREEN DOC REV: CPT | Performed by: PHYSICIAN ASSISTANT

## 2024-03-22 PROCEDURE — 99214 OFFICE O/P EST MOD 30 MIN: CPT | Performed by: PHYSICIAN ASSISTANT

## 2024-03-22 PROCEDURE — 95251 CONT GLUC MNTR ANALYSIS I&R: CPT | Performed by: PHYSICIAN ASSISTANT

## 2024-03-22 PROCEDURE — G8427 DOCREV CUR MEDS BY ELIG CLIN: HCPCS | Performed by: PHYSICIAN ASSISTANT

## 2024-03-22 RX ORDER — INSULIN DEGLUDEC 100 U/ML
INJECTION, SOLUTION SUBCUTANEOUS
Qty: 15 ADJUSTABLE DOSE PRE-FILLED PEN SYRINGE | Refills: 1 | Status: SHIPPED | OUTPATIENT
Start: 2024-03-22

## 2024-03-22 RX ORDER — INSULIN LISPRO 100 [IU]/ML
INJECTION, SOLUTION INTRAVENOUS; SUBCUTANEOUS
Qty: 30 ML | Refills: 3 | Status: SHIPPED | OUTPATIENT
Start: 2024-03-22

## 2024-03-22 RX ORDER — ACYCLOVIR 400 MG/1
TABLET ORAL
Qty: 9 EACH | Refills: 3 | Status: SHIPPED | OUTPATIENT
Start: 2024-03-22

## 2024-03-22 NOTE — PATIENT INSTRUCTIONS
Patient Education        Diabetes Foot Health: Care Instructions  Your Care Instructions     When you have diabetes, your feet need extra care and attention. Diabetes can damage the nerve endings and blood vessels in your feet, making you less likely to notice when your feet are injured. Diabetes also limits your body's ability to fight infection and get blood to areas that need it. If you get a minor foot injury, it could become an ulcer or a serious infection. With good foot care,you can prevent most of these problems.  Caring for your feet can be quick and easy. Most of the care can be done whenyou are bathing or getting ready for bed.  Follow-up care is a key part of your treatment and safety. Be sure to make and go to all appointments, and call your doctor if you are having problems. It's also a good idea to know your test results and keep alist of the medicines you take.  How can you care for yourself at home?  Keep your blood sugar close to normal by watching what and how much you eat, monitoring blood sugar, taking medicines if prescribed, and getting regular exercise.  Do not smoke. Smoking affects blood flow and can make foot problems worse. If you need help quitting, talk to your doctor about stop-smoking programs and medicines. These can increase your chances of quitting for good.  Eat a diet that is low in fats. High fat intake can cause fat to build up in your blood vessels and decrease blood flow.  Inspect your feet daily for blisters, cuts, cracks, or sores. If you cannot see well, use a mirror or have someone help you.  Take care of your feet:  Wash your feet every day. Use warm (not hot) water. Check the water temperature with your wrists or other part of your body, not your feet.  Dry your feet well. Pat them dry. Do not rub the skin on your feet too hard. Dry well between your toes. If the skin on your feet stays moist, bacteria or a fungus can grow, which can lead to infection.  Keep your skin

## 2024-03-22 NOTE — PROGRESS NOTES
Reviewed and updated this visit by provider:  Tobacco  Allergies  Meds  Problems  Med Hx  Surg Hx  Fam Hx              Allergies & Medications:  Reviewed in chart.        Review of Systems    Vital Signs:  /80 (Site: Left Upper Arm, Position: Sitting)   Wt 83 kg (183 lb)   BMI 26.26 kg/m²     Physical Exam  Constitutional:       Appearance: Normal appearance.   Neck:      Thyroid: No thyromegaly.   Cardiovascular:      Rate and Rhythm: Normal rate and regular rhythm.   Pulmonary:      Effort: Pulmonary effort is normal.      Breath sounds: Normal breath sounds.   Abdominal:      Palpations: Abdomen is soft.   Musculoskeletal:      Right lower leg: Edema (stasis dermatitis) present.      Left lower leg: Edema (stasis dermatitis) present.   Feet:      Right foot:      Protective Sensation: 3 sites tested.  3 sites sensed.      Left foot:      Protective Sensation: 3 sites tested.  3 sites sensed.      Comments: Intertriginous tinea maceration  Lymphadenopathy:      Cervical: No cervical adenopathy.   Skin:     General: Skin is warm and dry.      Comments: Bilateral LE edema with stasis dermatitis, dry flaking skin  Healing abrasion right anterior shin, no heat, erythema, purulence, or sign of infection       Neurological:      General: No focal deficit present.      Mental Status: He is alert.   Psychiatric:         Mood and Affect: Mood normal.         Behavior: Behavior normal.         Thought Content: Thought content normal.         Judgment: Judgment normal.            Return in about 3 months (around 6/22/2024) for Type 1 Diabetes f/u.      Portions of this note were generated with the assistance of voice recogniton software.  As such, some errors in transcription may be present.

## 2024-03-26 ENCOUNTER — TELEPHONE (OUTPATIENT)
Dept: ENDOCRINOLOGY | Age: 54
End: 2024-03-26

## 2024-03-26 NOTE — TELEPHONE ENCOUNTER
Submitted for [employer] Via parachute      Maye Sampson (You)   [facility]  just now  submitted to [employer]  Maye Sampson (You)   [facility]  just now  requested signature from DENIS OHARA via email  Maye Sampson (You)   [facility]  3m ago  uploaded documentation: Other (\"Ragladn\")  Maye Sampson (You)   [facility]  9m ago  created order

## 2024-04-01 DIAGNOSIS — E78.2 MIXED HYPERLIPIDEMIA: ICD-10-CM

## 2024-04-01 DIAGNOSIS — J30.2 SEASONAL ALLERGIES: ICD-10-CM

## 2024-04-01 DIAGNOSIS — F32.89 OTHER DEPRESSION: ICD-10-CM

## 2024-04-01 DIAGNOSIS — F41.9 ANXIETY: ICD-10-CM

## 2024-04-01 DIAGNOSIS — I10 PRIMARY HYPERTENSION: ICD-10-CM

## 2024-04-02 RX ORDER — CETIRIZINE HYDROCHLORIDE 10 MG/1
10 TABLET ORAL DAILY
Qty: 90 TABLET | Refills: 1 | Status: SHIPPED | OUTPATIENT
Start: 2024-04-02

## 2024-04-02 RX ORDER — PRAVASTATIN SODIUM 20 MG
20 TABLET ORAL DAILY
Qty: 90 TABLET | Refills: 1 | Status: SHIPPED | OUTPATIENT
Start: 2024-04-02

## 2024-04-02 RX ORDER — CITALOPRAM HYDROBROMIDE 10 MG/1
10 TABLET ORAL DAILY
Qty: 90 TABLET | Refills: 1 | Status: SHIPPED | OUTPATIENT
Start: 2024-04-02

## 2024-04-02 RX ORDER — LISINOPRIL 5 MG/1
5 TABLET ORAL DAILY
Qty: 90 TABLET | Refills: 1 | Status: SHIPPED | OUTPATIENT
Start: 2024-04-02

## 2024-04-04 ENCOUNTER — TELEPHONE (OUTPATIENT)
Dept: ENDOCRINOLOGY | Age: 54
End: 2024-04-04

## 2024-04-04 NOTE — TELEPHONE ENCOUNTER
Ok. Please make sure he is now taking his insulin as below:     tresiba  26 units daily (take same amount every day)      Encouraged patient 4 units of prandial insulin before meals and use a 1 per 50 greater than 150 correction scale AC meals. Shot sheet given--use chart. Take 4 if glucose is normal <150 before each meal    If hypoglycemia is appreciated, contact office

## 2024-04-04 NOTE — TELEPHONE ENCOUNTER
Patient called into the office to inform you he has decided not to go with the [employer] Pump. He states it will be too much for him to deal with.

## 2024-04-10 ASSESSMENT — PATIENT HEALTH QUESTIONNAIRE - PHQ9
SUM OF ALL RESPONSES TO PHQ9 QUESTIONS 1 & 2: 0
4. FEELING TIRED OR HAVING LITTLE ENERGY: NOT AT ALL
5. POOR APPETITE OR OVEREATING: NOT AT ALL
SUM OF ALL RESPONSES TO PHQ QUESTIONS 1-9: 0
2. FEELING DOWN, DEPRESSED OR HOPELESS: NOT AT ALL
SUM OF ALL RESPONSES TO PHQ QUESTIONS 1-9: 0
9. THOUGHTS THAT YOU WOULD BE BETTER OFF DEAD, OR OF HURTING YOURSELF: NOT AT ALL
SUM OF ALL RESPONSES TO PHQ QUESTIONS 1-9: 0
6. FEELING BAD ABOUT YOURSELF - OR THAT YOU ARE A FAILURE OR HAVE LET YOURSELF OR YOUR FAMILY DOWN: NOT AT ALL
6. FEELING BAD ABOUT YOURSELF - OR THAT YOU ARE A FAILURE OR HAVE LET YOURSELF OR YOUR FAMILY DOWN: NOT AT ALL
SUM OF ALL RESPONSES TO PHQ QUESTIONS 1-9: 0
10. IF YOU CHECKED OFF ANY PROBLEMS, HOW DIFFICULT HAVE THESE PROBLEMS MADE IT FOR YOU TO DO YOUR WORK, TAKE CARE OF THINGS AT HOME, OR GET ALONG WITH OTHER PEOPLE: NOT DIFFICULT AT ALL
3. TROUBLE FALLING OR STAYING ASLEEP: NOT AT ALL
8. MOVING OR SPEAKING SO SLOWLY THAT OTHER PEOPLE COULD HAVE NOTICED. OR THE OPPOSITE, BEING SO FIGETY OR RESTLESS THAT YOU HAVE BEEN MOVING AROUND A LOT MORE THAN USUAL: NOT AT ALL
1. LITTLE INTEREST OR PLEASURE IN DOING THINGS: NOT AT ALL
1. LITTLE INTEREST OR PLEASURE IN DOING THINGS: NOT AT ALL
4. FEELING TIRED OR HAVING LITTLE ENERGY: NOT AT ALL
SUM OF ALL RESPONSES TO PHQ QUESTIONS 1-9: 0
3. TROUBLE FALLING OR STAYING ASLEEP: NOT AT ALL
7. TROUBLE CONCENTRATING ON THINGS, SUCH AS READING THE NEWSPAPER OR WATCHING TELEVISION: NOT AT ALL
8. MOVING OR SPEAKING SO SLOWLY THAT OTHER PEOPLE COULD HAVE NOTICED. OR THE OPPOSITE - BEING SO FIDGETY OR RESTLESS THAT YOU HAVE BEEN MOVING AROUND A LOT MORE THAN USUAL: NOT AT ALL
9. THOUGHTS THAT YOU WOULD BE BETTER OFF DEAD, OR OF HURTING YOURSELF: NOT AT ALL
2. FEELING DOWN, DEPRESSED OR HOPELESS: NOT AT ALL
10. IF YOU CHECKED OFF ANY PROBLEMS, HOW DIFFICULT HAVE THESE PROBLEMS MADE IT FOR YOU TO DO YOUR WORK, TAKE CARE OF THINGS AT HOME, OR GET ALONG WITH OTHER PEOPLE: NOT DIFFICULT AT ALL
5. POOR APPETITE OR OVEREATING: NOT AT ALL
7. TROUBLE CONCENTRATING ON THINGS, SUCH AS READING THE NEWSPAPER OR WATCHING TELEVISION: NOT AT ALL

## 2024-04-16 NOTE — PROGRESS NOTES
Well oriented, normal mood and affect.   Skin: No rashes, lesions or ulcers, normal temperature, turgor, and texture on uninvolved extremity.      ORTHO EXAM:    Left Shoulder:      Inspection: No deformity, No erythema  ROM: Active / passive forward flexion 130 / 130   Active / passive abduction 90  / 90  Internal rotation sacrum  External rotation 30  Tenderness: No tenderness  Strength: Abduction 5/5, External rotation 5/5, Internal rotation 5/5  Provocative tests: negative hornblower, bearhugger, drop arm, jobes. Positive dennis.   Normal capillary refill / 2+ radial pulse   Sensation intact to light touch       DIAGNOSTIC IMAGING:     I have reviewed prior imaging studies. No new imaging studies required.     ASSESSMENT/PLAN:   1. Adhesive capsulitis of left shoulder         Bubba continues to exhibit limited range of motion but some improvement. He is requesting to be discharged from care today.  I advised that with continued mobility and time he is likely to see continued improvement in his motion.  We did discuss that he is at risk for adhesive capsulitis on the right shoulder.  I would not recommend being more aggressive given his controlled diabetes at this time, but if he is having continued issues we can consider potential surgical intervention for ILDA or debridement.     Orders / medications today: No orders of the defined types were placed in this encounter.     Follow up: Return if symptoms worsen or fail to improve.       The patient expressed understanding and agreed with the plan.     Nury Schneider MD   Orthopaedics and Sports Medicine  [facility]     This document was created using voice recognition software so frequent mistakes are possible. For any concerns about the wording of this document, please contact its creator for further clarification.

## 2024-04-17 ENCOUNTER — OFFICE VISIT (OUTPATIENT)
Dept: ORTHOPEDIC SURGERY | Age: 54
End: 2024-04-17
Payer: MEDICARE

## 2024-04-17 ENCOUNTER — OFFICE VISIT (OUTPATIENT)
Dept: INTERNAL MEDICINE CLINIC | Facility: CLINIC | Age: 54
End: 2024-04-17
Payer: MEDICARE

## 2024-04-17 VITALS
SYSTOLIC BLOOD PRESSURE: 120 MMHG | BODY MASS INDEX: 24.42 KG/M2 | HEART RATE: 61 BPM | HEIGHT: 70 IN | OXYGEN SATURATION: 99 % | DIASTOLIC BLOOD PRESSURE: 76 MMHG | WEIGHT: 170.6 LBS

## 2024-04-17 DIAGNOSIS — I10 PRIMARY HYPERTENSION: Primary | ICD-10-CM

## 2024-04-17 DIAGNOSIS — M75.02 ADHESIVE CAPSULITIS OF LEFT SHOULDER: Primary | ICD-10-CM

## 2024-04-17 DIAGNOSIS — E10.69 TYPE 1 DIABETES MELLITUS WITH OTHER SPECIFIED COMPLICATION (HCC): ICD-10-CM

## 2024-04-17 DIAGNOSIS — F32.89 OTHER DEPRESSION: ICD-10-CM

## 2024-04-17 DIAGNOSIS — E78.2 MIXED HYPERLIPIDEMIA: ICD-10-CM

## 2024-04-17 DIAGNOSIS — F41.9 ANXIETY: ICD-10-CM

## 2024-04-17 PROCEDURE — G8428 CUR MEDS NOT DOCUMENT: HCPCS | Performed by: STUDENT IN AN ORGANIZED HEALTH CARE EDUCATION/TRAINING PROGRAM

## 2024-04-17 PROCEDURE — G8419 CALC BMI OUT NRM PARAM NOF/U: HCPCS | Performed by: STUDENT IN AN ORGANIZED HEALTH CARE EDUCATION/TRAINING PROGRAM

## 2024-04-17 PROCEDURE — G8420 CALC BMI NORM PARAMETERS: HCPCS | Performed by: INTERNAL MEDICINE

## 2024-04-17 PROCEDURE — 99213 OFFICE O/P EST LOW 20 MIN: CPT | Performed by: STUDENT IN AN ORGANIZED HEALTH CARE EDUCATION/TRAINING PROGRAM

## 2024-04-17 PROCEDURE — 2022F DILAT RTA XM EVC RTNOPTHY: CPT | Performed by: INTERNAL MEDICINE

## 2024-04-17 PROCEDURE — 3046F HEMOGLOBIN A1C LEVEL >9.0%: CPT | Performed by: INTERNAL MEDICINE

## 2024-04-17 PROCEDURE — 3078F DIAST BP <80 MM HG: CPT | Performed by: INTERNAL MEDICINE

## 2024-04-17 PROCEDURE — G8427 DOCREV CUR MEDS BY ELIG CLIN: HCPCS | Performed by: INTERNAL MEDICINE

## 2024-04-17 PROCEDURE — 99214 OFFICE O/P EST MOD 30 MIN: CPT | Performed by: INTERNAL MEDICINE

## 2024-04-17 PROCEDURE — 3017F COLORECTAL CA SCREEN DOC REV: CPT | Performed by: INTERNAL MEDICINE

## 2024-04-17 PROCEDURE — 3074F SYST BP LT 130 MM HG: CPT | Performed by: INTERNAL MEDICINE

## 2024-04-17 PROCEDURE — 1036F TOBACCO NON-USER: CPT | Performed by: INTERNAL MEDICINE

## 2024-04-17 PROCEDURE — 3017F COLORECTAL CA SCREEN DOC REV: CPT | Performed by: STUDENT IN AN ORGANIZED HEALTH CARE EDUCATION/TRAINING PROGRAM

## 2024-04-17 PROCEDURE — 1036F TOBACCO NON-USER: CPT | Performed by: STUDENT IN AN ORGANIZED HEALTH CARE EDUCATION/TRAINING PROGRAM

## 2024-04-17 SDOH — ECONOMIC STABILITY: INCOME INSECURITY: HOW HARD IS IT FOR YOU TO PAY FOR THE VERY BASICS LIKE FOOD, HOUSING, MEDICAL CARE, AND HEATING?: NOT VERY HARD

## 2024-04-17 SDOH — ECONOMIC STABILITY: FOOD INSECURITY: WITHIN THE PAST 12 MONTHS, THE FOOD YOU BOUGHT JUST DIDN'T LAST AND YOU DIDN'T HAVE MONEY TO GET MORE.: NEVER TRUE

## 2024-04-17 SDOH — ECONOMIC STABILITY: FOOD INSECURITY: WITHIN THE PAST 12 MONTHS, YOU WORRIED THAT YOUR FOOD WOULD RUN OUT BEFORE YOU GOT MONEY TO BUY MORE.: NEVER TRUE

## 2024-04-17 ASSESSMENT — ENCOUNTER SYMPTOMS
PHOTOPHOBIA: 0
WHEEZING: 0
CHEST TIGHTNESS: 0
ABDOMINAL DISTENTION: 0
SHORTNESS OF BREATH: 0

## 2024-04-17 NOTE — PROGRESS NOTES
the statin therapy,  For his depression anxiety, he will continue with SSRI therapy  For diabetes, will continue with diabetic clinic, instructions, patient has been recommended to increase long-acting insulin to 28 units, discussed with endocrine for further instructions,  He will continue monitoring with Accu-Chek, I will be asking endocrine in regards to options for Dexcom 7 especially with his inability to use the phone elisha.    Return in about 6 months (around 10/17/2024) for AWV.     Review of system:    Review of Systems   Constitutional:  Negative for activity change, fatigue and unexpected weight change.   Eyes:  Negative for photophobia and visual disturbance.   Respiratory:  Negative for chest tightness, shortness of breath and wheezing.    Cardiovascular:  Negative for chest pain, palpitations and leg swelling.   Gastrointestinal:  Negative for abdominal distention.   Genitourinary:  Negative for difficulty urinating and frequency.   Musculoskeletal:  Negative for myalgias.   Neurological:  Negative for dizziness and headaches.         Immunization history:    Immunization History   Administered Date(s) Administered    COVID-19, J&J, (age 18y+), IM, 0.5 mL 08/04/2021, 12/28/2021    Influenza Virus Vaccine 10/19/2018    Influenza, FLUBLOK, (age 18 y+), PF, 0.5mL 11/08/2019, 11/03/2020, 12/22/2021    Influenza, FLUCELVAX, (age 6 mo+), MDCK, PF, 0.5mL 10/17/2023    Influenza, Triv, 3 Years and older, IM (Afluria (5 yrs and older) 10/12/2012    Pneumococcal, PCV-13, PREVNAR 13, (age 6w+), IM, 0.5mL 05/02/2016    Pneumococcal, PPSV23, PNEUMOVAX 23, (age 2y+), SC/IM, 0.5mL 09/09/2015, 10/19/2016    TD 5LF, TENIVAC, (age 7y+), IM, 0.5mL 08/23/2023    TDaP, ADACEL (age 10y-64y), BOOSTRIX (age 10y+), IM, 0.5mL 05/01/2014, 05/02/2016       Current medications:      Current Outpatient Medications:     lisinopril (PRINIVIL;ZESTRIL) 5 MG tablet, TAKE ONE (1) TABLET BY MOUTH DAILY, Disp: 90 tablet, Rfl: 1

## 2024-04-18 NOTE — TELEPHONE ENCOUNTER
Spoke with patient, taking correct dose of Tresiba. Understands to call our office if any problems.

## 2024-04-19 ENCOUNTER — TELEPHONE (OUTPATIENT)
Dept: ENDOCRINOLOGY | Age: 54
End: 2024-04-19

## 2024-04-19 NOTE — TELEPHONE ENCOUNTER
----- Message from Lillian Bernal PA-C sent at 4/17/2024  4:53 PM EDT -----  Hi,  There is a \"\" for the G7 on epic    Because he has medicare, the  [place] [facility] [place] may have a sample to give him    I have included Maye on this message so she can connect with patient and help    Thanks for letting us know    ~Lillian  ----- Message -----  From: Radha Forbes MD  Sent: 4/17/2024  10:58 AM EDT  To: Lillian Bernal PA-C    Good morning  Bubba has received the G 7 sensors , But unfortunately has not been able to use the elisha on his phone , any options? I do not see a transmitter for the G7

## 2024-05-23 ENCOUNTER — HOSPITAL ENCOUNTER (EMERGENCY)
Age: 54
Discharge: HOME | End: 2024-05-23
Payer: MEDICARE

## 2024-05-23 ENCOUNTER — APPOINTMENT (OUTPATIENT)
Dept: GENERAL RADIOLOGY | Age: 54
End: 2024-05-23
Payer: MEDICARE

## 2024-05-23 VITALS
TEMPERATURE: 98.4 F | OXYGEN SATURATION: 100 % | WEIGHT: 170 LBS | SYSTOLIC BLOOD PRESSURE: 121 MMHG | HEART RATE: 70 BPM | DIASTOLIC BLOOD PRESSURE: 72 MMHG | RESPIRATION RATE: 20 BRPM | BODY MASS INDEX: 24.39 KG/M2

## 2024-05-23 DIAGNOSIS — M79.605 LEG PAIN, ANTERIOR, LEFT: Primary | ICD-10-CM

## 2024-05-23 DIAGNOSIS — S81.802A WOUND OF LEFT LOWER EXTREMITY, INITIAL ENCOUNTER: ICD-10-CM

## 2024-05-23 LAB
ALBUMIN SERPL-MCNC: 4.2 G/DL (ref 3.5–5)
ALBUMIN/GLOB SERPL: 1.7 (ref 0.4–1.6)
ALP SERPL-CCNC: 120 U/L (ref 45–117)
ALT SERPL-CCNC: 14 U/L (ref 13–61)
ANION GAP SERPL CALC-SCNC: 12 MMOL/L (ref 2–11)
AST SERPL-CCNC: 19 U/L (ref 15–37)
BASOPHILS # BLD: 0.1 K/UL (ref 0–0.2)
BASOPHILS NFR BLD: 1 % (ref 0–2)
BILIRUB SERPL-MCNC: 0.2 MG/DL (ref 0.2–1.1)
BUN SERPL-MCNC: 19 MG/DL (ref 6–23)
CALCIUM SERPL-MCNC: 9.3 MG/DL (ref 8.3–10.4)
CHLORIDE SERPL-SCNC: 101 MMOL/L (ref 98–107)
CO2 SERPL-SCNC: 27 MMOL/L (ref 21–32)
CREAT SERPL-MCNC: 0.97 MG/DL (ref 0.8–1.5)
DIFFERENTIAL METHOD BLD: ABNORMAL
EOSINOPHIL # BLD: 0.4 K/UL (ref 0–0.8)
EOSINOPHIL NFR BLD: 4 % (ref 0.5–7.8)
ERYTHROCYTE [DISTWIDTH] IN BLOOD BY AUTOMATED COUNT: 13.3 % (ref 11.9–14.6)
GLOBULIN SER CALC-MCNC: 2.5 G/DL (ref 2.8–4.5)
GLUCOSE SERPL-MCNC: 84 MG/DL (ref 65–100)
HCT VFR BLD AUTO: 42.4 % (ref 41.1–50.3)
HGB BLD-MCNC: 14.8 G/DL (ref 13.6–17.2)
IMM GRANULOCYTES # BLD AUTO: 0 K/UL (ref 0–0.5)
IMM GRANULOCYTES NFR BLD AUTO: 0 % (ref 0–5)
LYMPHOCYTES # BLD: 2.7 K/UL (ref 0.5–4.6)
LYMPHOCYTES NFR BLD: 30 % (ref 13–44)
MCH RBC QN AUTO: 32.9 PG (ref 26.1–32.9)
MCHC RBC AUTO-ENTMCNC: 34.9 G/DL (ref 31.4–35)
MCV RBC AUTO: 94.2 FL (ref 82–102)
MONOCYTES # BLD: 0.6 K/UL (ref 0.1–1.3)
MONOCYTES NFR BLD: 7 % (ref 4–12)
NEUTS SEG # BLD: 5.3 K/UL (ref 1.7–8.2)
NEUTS SEG NFR BLD: 59 % (ref 43–78)
NRBC # BLD: 0 K/UL (ref 0–0.2)
PLATELET # BLD AUTO: 302 K/UL (ref 150–450)
PMV BLD AUTO: 9.2 FL (ref 9.4–12.3)
POTASSIUM SERPL-SCNC: 4.5 MMOL/L (ref 3.5–5.1)
PROT SERPL-MCNC: 6.7 G/DL (ref 6.4–8.2)
RBC # BLD AUTO: 4.5 M/UL (ref 4.23–5.6)
SODIUM SERPL-SCNC: 140 MMOL/L (ref 133–143)
WBC # BLD AUTO: 8.9 K/UL (ref 4.3–11.1)

## 2024-05-23 PROCEDURE — 85025 COMPLETE CBC W/AUTO DIFF WBC: CPT

## 2024-05-23 PROCEDURE — 99284 EMERGENCY DEPT VISIT MOD MDM: CPT

## 2024-05-23 PROCEDURE — 6360000002 HC RX W HCPCS: Performed by: STUDENT IN AN ORGANIZED HEALTH CARE EDUCATION/TRAINING PROGRAM

## 2024-05-23 PROCEDURE — 80053 COMPREHEN METABOLIC PANEL: CPT

## 2024-05-23 PROCEDURE — 96374 THER/PROPH/DIAG INJ IV PUSH: CPT

## 2024-05-23 PROCEDURE — 73590 X-RAY EXAM OF LOWER LEG: CPT

## 2024-05-23 RX ORDER — KETOROLAC TROMETHAMINE 15 MG/ML
15 INJECTION, SOLUTION INTRAMUSCULAR; INTRAVENOUS ONCE
Status: COMPLETED | OUTPATIENT
Start: 2024-05-23 | End: 2024-05-23

## 2024-05-23 RX ORDER — CEPHALEXIN 500 MG/1
500 CAPSULE ORAL 4 TIMES DAILY
Qty: 28 CAPSULE | Refills: 0 | Status: SHIPPED | OUTPATIENT
Start: 2024-05-23 | End: 2024-05-30

## 2024-05-23 RX ORDER — MELOXICAM 15 MG/1
7.5 TABLET ORAL DAILY
Qty: 15 TABLET | Refills: 0 | Status: SHIPPED | OUTPATIENT
Start: 2024-05-23 | End: 2024-06-22

## 2024-05-23 RX ADMIN — KETOROLAC TROMETHAMINE 15 MG: 15 INJECTION, SOLUTION INTRAMUSCULAR; INTRAVENOUS at 21:19

## 2024-05-23 ASSESSMENT — ENCOUNTER SYMPTOMS
ABDOMINAL PAIN: 0
FACIAL SWELLING: 0
COUGH: 0
TROUBLE SWALLOWING: 0
VOMITING: 0
PHOTOPHOBIA: 0
SHORTNESS OF BREATH: 0

## 2024-05-23 ASSESSMENT — PAIN SCALES - GENERAL
PAINLEVEL_OUTOF10: 6
PAINLEVEL_OUTOF10: 5

## 2024-05-23 ASSESSMENT — PAIN - FUNCTIONAL ASSESSMENT: PAIN_FUNCTIONAL_ASSESSMENT: 0-10

## 2024-05-23 NOTE — ED TRIAGE NOTES
Pt coming in for swollen and irritated left leg after having a trash can fall on it.  Pt states this happened on Saturday and has been putting triple antibiotic cream on it. Pt is able to bear weight on leg.

## 2024-05-24 ENCOUNTER — CARE COORDINATION (OUTPATIENT)
Dept: CARE COORDINATION | Facility: CLINIC | Age: 54
End: 2024-05-24

## 2024-05-24 NOTE — ED PROVIDER NOTES
Emergency Department Provider Note       PCP: Radha Forbes MD   Age: 53 y.o.   Sex: male     DISPOSITION Decision To Discharge 05/23/2024 10:00:11 PM       ICD-10-CM    1. Leg pain, anterior, left  M79.605       2. Wound of left lower extremity, initial encounter  S81.802A           Medical Decision Making     In summary this is a 53-year-old male patient presented for evaluation of left lower leg pain that began 4 days ago after an injury with a trash can.  On exam he does have a small scab as well as some surrounding erythema.  It does appear that he has chronic stasis dermatitis as is evidenced by the photo and on his bilateral legs.  He does admit that the erythematous rash has been there for \"a long time.\"  Due to him being a diabetic, I think we should be more conservative and treat with antibiotics in case of any developing cellulitis.  I have low suspicion for sepsis as he does not meet SIRS criteria.  He is nontoxic.  He has no soft tissue crepitus or signs concerning for necrotizing fasciitis or gangrene.  His x-ray is negative today.  No signs of compartment syndrome.  Low suspicion for DVT but given mechanism of injury and clinical exam.  No signs of acute arterial occlusion.  Will discharge with antibiotics and pain medicine.  Counseled on signs to return for.  The patient has verbalized understanding and agreed with the plan.  Discharged in stable condition.     1 acute, uncomplicated illness or injury.  Prescription drug management performed.  Patient was discharged risks and benefits of hospitalization were considered.  Shared medical decision making was utilized in creating the patients health plan today.  ED attending physician present in department at time of care. Based on current hospital policy, their co-signature is not required on this note.   I independently ordered and reviewed each unique test.       I interpreted the X-rays no fx.  RADIOLOGY DISCLAIMER: Although I do my

## 2024-05-24 NOTE — CARE COORDINATION
Ambulatory Care Management Note    Date/Time:  5/24/2024 8:50 AM    This patient was received as a referral from Daily assignment.  Ambulatory Care Manager outreached to patient today to offer care management services.   Introduction to self and role of care manager provided.  Patient declined care management services at this time.   No follow up call scheduled at this time.  Patient has Ambulatory Care Manager's contact number for for any questions or concerns.

## 2024-05-24 NOTE — DISCHARGE INSTRUCTIONS
Your x-ray was normal today.  Your inflammatory markers were negative.  Please take the antibiotics for the full course to prevent any developing infection.  Use Mobic once daily for pain.  Return here for new or worsening symptoms.  Otherwise please follow-up with your family doctor in 3 days for a wound check.    As we discussed, I did not find a life threatening cause of your symptoms today. However, THAT DOES NOT MEAN IT COULD NOT DEVELOP. If you develop ANY new or worsening symptoms, it is critical that you return for re-evaluation. This includes any symptoms that are concerning to you, especially symptoms such as difficulty bearing weight, fevers, red streaking up your leg.  If you do not return for re-evaluation, you risk serious complications, including death.

## 2024-06-21 ENCOUNTER — TELEPHONE (OUTPATIENT)
Dept: ENDOCRINOLOGY | Age: 54
End: 2024-06-21

## 2024-06-21 DIAGNOSIS — E10.65 TYPE 1 DIABETES MELLITUS WITH HYPERGLYCEMIA (HCC): ICD-10-CM

## 2024-06-21 RX ORDER — ACYCLOVIR 400 MG/1
TABLET ORAL
Qty: 1 EACH | Refills: 0 | Status: SHIPPED | OUTPATIENT
Start: 2024-06-21

## 2024-06-21 RX ORDER — ACYCLOVIR 400 MG/1
TABLET ORAL
Qty: 9 EACH | Refills: 3 | Status: SHIPPED | OUTPATIENT
Start: 2024-06-21

## 2024-06-21 NOTE — TELEPHONE ENCOUNTER
There is no separate transmitter with the G7 so I sent in a  and refill sensors to [facility] . If the sensor (with the combined transmitter) came out early, he may need to contact dexcom to replace it.     If its the , we may want to see if we have a sample to give him as the cost will be significant for a replacement

## 2024-06-25 ENCOUNTER — OFFICE VISIT (OUTPATIENT)
Dept: ENDOCRINOLOGY | Age: 54
End: 2024-06-25
Payer: MEDICARE

## 2024-06-25 VITALS
OXYGEN SATURATION: 97 % | BODY MASS INDEX: 26.66 KG/M2 | WEIGHT: 185.8 LBS | SYSTOLIC BLOOD PRESSURE: 122 MMHG | DIASTOLIC BLOOD PRESSURE: 80 MMHG | HEART RATE: 75 BPM

## 2024-06-25 DIAGNOSIS — E10.65 TYPE 1 DIABETES MELLITUS WITH HYPERGLYCEMIA (HCC): Primary | ICD-10-CM

## 2024-06-25 DIAGNOSIS — F81.9 DELAY OF COGNITIVE DEVELOPMENT: ICD-10-CM

## 2024-06-25 DIAGNOSIS — Z86.39 HISTORY OF KETOACIDOSIS: ICD-10-CM

## 2024-06-25 LAB — HBA1C MFR BLD: 8.8 %

## 2024-06-25 PROCEDURE — 3017F COLORECTAL CA SCREEN DOC REV: CPT | Performed by: PHYSICIAN ASSISTANT

## 2024-06-25 PROCEDURE — 3079F DIAST BP 80-89 MM HG: CPT | Performed by: PHYSICIAN ASSISTANT

## 2024-06-25 PROCEDURE — 3046F HEMOGLOBIN A1C LEVEL >9.0%: CPT | Performed by: PHYSICIAN ASSISTANT

## 2024-06-25 PROCEDURE — 99214 OFFICE O/P EST MOD 30 MIN: CPT | Performed by: PHYSICIAN ASSISTANT

## 2024-06-25 PROCEDURE — G8419 CALC BMI OUT NRM PARAM NOF/U: HCPCS | Performed by: PHYSICIAN ASSISTANT

## 2024-06-25 PROCEDURE — 1036F TOBACCO NON-USER: CPT | Performed by: PHYSICIAN ASSISTANT

## 2024-06-25 PROCEDURE — 2022F DILAT RTA XM EVC RTNOPTHY: CPT | Performed by: PHYSICIAN ASSISTANT

## 2024-06-25 PROCEDURE — 83036 HEMOGLOBIN GLYCOSYLATED A1C: CPT | Performed by: PHYSICIAN ASSISTANT

## 2024-06-25 PROCEDURE — G8427 DOCREV CUR MEDS BY ELIG CLIN: HCPCS | Performed by: PHYSICIAN ASSISTANT

## 2024-06-25 PROCEDURE — 3074F SYST BP LT 130 MM HG: CPT | Performed by: PHYSICIAN ASSISTANT

## 2024-06-25 PROCEDURE — 95251 CONT GLUC MNTR ANALYSIS I&R: CPT | Performed by: PHYSICIAN ASSISTANT

## 2024-06-25 RX ORDER — INSULIN LISPRO 100 [IU]/ML
INJECTION, SOLUTION INTRAVENOUS; SUBCUTANEOUS
Qty: 30 ML | Refills: 3 | Status: SHIPPED | OUTPATIENT
Start: 2024-06-25

## 2024-06-25 RX ORDER — INSULIN DEGLUDEC 100 U/ML
INJECTION, SOLUTION SUBCUTANEOUS
Qty: 30 ML | Refills: 3 | Status: SHIPPED | OUTPATIENT
Start: 2024-06-25

## 2024-06-25 NOTE — PROGRESS NOTES
[facility] ENDOCRINOLOGY   AND   THYROID NODULE CLINIC    Lillian Bernal PA-C  [facility] Endocrinology and Thy[facility]od[facility]  [address], [address]  [place] [address]  Phone [phone]  Facsimile [phone]          Bubba Ragland is a 53 y.o. male seen 6/25/2024 for follow up evaluation of type 1 diabetes         Assessment and Plan:    Interpretation of 72 hour glucose monitor:  At least 72 hours of data were reviewed.  The patient utilizes a dexcom G6 continuous glucose monitoring system.  The average glucose during the reviewed timeframe was 253.  There is a pattern of constant postprandial hyperglycemia after meals.    1. Type 1 diabetes mellitus with hyperglycemia (HCC)  Glycemic control is poor but improving.  It should be noted that patient is taking correction scale 4 units plus 1 per 50 greater than 150 correction only if glucose is over 150.  improved hypoglycemia.      Encouraged patient to continue 4 units of prandial insulin before meals and use a 1 per 50 greater than 150 correction scale AC meals. He is reticent to take 4 units when glucose is in low 100 but willing to try 3 units for meals when glucose is 100-150    If hypoglycemia is appreciated, contact office    He would likely benefit from diabetes education     Upgrade dexcom to G7 that is pump compatible    At today's visit we discussed sequela associated with uncontrolled diabetes including increased risk of stroke, heart attack, kidney failure, amputation, retinopathy, neuropathy, and nephropathy.  Patient was strongly encouraged to comply with treatment regimen as well as dietary and exercise recommendations to aid in control of this chronic disease to help prevent complications associated with uncontrolled diabetes.    Home foot care discussed       - AMB POC HEMOGLOBIN A1C  - HUMALOG KWIKPEN 100 UNIT/ML SOPN; 4 units AC meal plus  correction scale AC/HS 1/50>150. If between 100-150 take 3 AC meals, max

## 2024-09-06 DIAGNOSIS — E10.69 TYPE 1 DIABETES MELLITUS WITH OTHER SPECIFIED COMPLICATION (HCC): ICD-10-CM

## 2024-09-06 LAB
ALBUMIN SERPL-MCNC: 3.8 G/DL (ref 3.5–5)
ALBUMIN/GLOB SERPL: 1.1 (ref 1–1.9)
ALP SERPL-CCNC: 150 U/L (ref 40–129)
ALT SERPL-CCNC: 79 U/L (ref 12–65)
ANION GAP SERPL CALC-SCNC: 8 MMOL/L (ref 9–18)
AST SERPL-CCNC: 40 U/L (ref 15–37)
BILIRUB SERPL-MCNC: 0.4 MG/DL (ref 0–1.2)
BUN SERPL-MCNC: 16 MG/DL (ref 6–23)
CALCIUM SERPL-MCNC: 9.3 MG/DL (ref 8.8–10.2)
CHLORIDE SERPL-SCNC: 100 MMOL/L (ref 98–107)
CO2 SERPL-SCNC: 31 MMOL/L (ref 20–28)
CREAT SERPL-MCNC: 0.81 MG/DL (ref 0.8–1.3)
CREAT UR-MCNC: 43.8 MG/DL (ref 39–259)
EST. AVERAGE GLUCOSE BLD GHB EST-MCNC: 221 MG/DL
GLOBULIN SER CALC-MCNC: 3.4 G/DL (ref 2.3–3.5)
GLUCOSE SERPL-MCNC: 111 MG/DL (ref 70–99)
HBA1C MFR BLD: 9.3 % (ref 0–5.6)
MICROALBUMIN UR-MCNC: <1.2 MG/DL (ref 0–20)
MICROALBUMIN/CREAT UR-RTO: NORMAL MG/G (ref 0–30)
POTASSIUM SERPL-SCNC: 4.3 MMOL/L (ref 3.5–5.1)
PROT SERPL-MCNC: 7.2 G/DL (ref 6.3–8.2)
SODIUM SERPL-SCNC: 139 MMOL/L (ref 136–145)

## 2024-10-02 DIAGNOSIS — E78.2 MIXED HYPERLIPIDEMIA: ICD-10-CM

## 2024-10-02 DIAGNOSIS — F41.9 ANXIETY: ICD-10-CM

## 2024-10-02 DIAGNOSIS — F32.89 OTHER DEPRESSION: ICD-10-CM

## 2024-10-02 DIAGNOSIS — I10 PRIMARY HYPERTENSION: ICD-10-CM

## 2024-10-02 DIAGNOSIS — J30.2 SEASONAL ALLERGIES: ICD-10-CM

## 2024-10-02 RX ORDER — CITALOPRAM HYDROBROMIDE 10 MG/1
10 TABLET ORAL DAILY
Qty: 90 TABLET | Refills: 1 | Status: SHIPPED | OUTPATIENT
Start: 2024-10-02

## 2024-10-02 RX ORDER — PRAVASTATIN SODIUM 20 MG
20 TABLET ORAL DAILY
Qty: 90 TABLET | Refills: 1 | Status: SHIPPED | OUTPATIENT
Start: 2024-10-02

## 2024-10-02 RX ORDER — LISINOPRIL 5 MG/1
5 TABLET ORAL DAILY
Qty: 90 TABLET | Refills: 1 | Status: SHIPPED | OUTPATIENT
Start: 2024-10-02

## 2024-10-02 RX ORDER — CETIRIZINE HYDROCHLORIDE 10 MG/1
10 TABLET ORAL DAILY
Qty: 90 TABLET | Refills: 1 | Status: SHIPPED | OUTPATIENT
Start: 2024-10-02

## 2024-10-02 NOTE — TELEPHONE ENCOUNTER
Requested Prescriptions     Pending Prescriptions Disp Refills    cetirizine (ZYRTEC) 10 MG tablet 90 tablet 1     Sig: Take 1 tablet by mouth daily    citalopram (CELEXA) 10 MG tablet 90 tablet 1     Sig: Take 1 tablet by mouth daily    lisinopril (PRINIVIL;ZESTRIL) 5 MG tablet 90 tablet 1     Sig: Take 1 tablet by mouth daily    pravastatin (PRAVACHOL) 20 MG tablet 90 tablet 1     Sig: Take 1 tablet by mouth daily

## 2024-10-04 DIAGNOSIS — I10 PRIMARY HYPERTENSION: ICD-10-CM

## 2024-10-04 DIAGNOSIS — F32.89 OTHER DEPRESSION: ICD-10-CM

## 2024-10-04 DIAGNOSIS — E78.2 MIXED HYPERLIPIDEMIA: ICD-10-CM

## 2024-10-04 DIAGNOSIS — J30.2 SEASONAL ALLERGIES: ICD-10-CM

## 2024-10-04 DIAGNOSIS — F41.9 ANXIETY: ICD-10-CM

## 2024-10-04 RX ORDER — CITALOPRAM HYDROBROMIDE 10 MG/1
10 TABLET ORAL DAILY
Qty: 90 TABLET | Refills: 0 | OUTPATIENT
Start: 2024-10-04

## 2024-10-04 RX ORDER — LISINOPRIL 5 MG/1
5 TABLET ORAL DAILY
Qty: 90 TABLET | Refills: 0 | OUTPATIENT
Start: 2024-10-04

## 2024-10-04 RX ORDER — CETIRIZINE HYDROCHLORIDE 10 MG/1
10 TABLET ORAL DAILY
Qty: 90 TABLET | Refills: 0 | OUTPATIENT
Start: 2024-10-04

## 2024-10-04 RX ORDER — PRAVASTATIN SODIUM 20 MG
20 TABLET ORAL DAILY
Qty: 90 TABLET | Refills: 0 | OUTPATIENT
Start: 2024-10-04

## 2024-10-06 ENCOUNTER — HOSPITAL ENCOUNTER (EMERGENCY)
Age: 54
Discharge: HOME | End: 2024-10-06
Attending: EMERGENCY MEDICINE
Payer: MEDICARE

## 2024-10-06 VITALS
DIASTOLIC BLOOD PRESSURE: 74 MMHG | HEIGHT: 70 IN | HEART RATE: 74 BPM | RESPIRATION RATE: 18 BRPM | OXYGEN SATURATION: 100 % | BODY MASS INDEX: 26.66 KG/M2 | TEMPERATURE: 98.2 F | SYSTOLIC BLOOD PRESSURE: 108 MMHG

## 2024-10-06 DIAGNOSIS — L03.119 CELLULITIS OF LOWER EXTREMITY, UNSPECIFIED LATERALITY: Primary | ICD-10-CM

## 2024-10-06 PROCEDURE — 2500000003 HC RX 250 WO HCPCS: Performed by: EMERGENCY MEDICINE

## 2024-10-06 PROCEDURE — 99283 EMERGENCY DEPT VISIT LOW MDM: CPT

## 2024-10-06 PROCEDURE — 6370000000 HC RX 637 (ALT 250 FOR IP): Performed by: EMERGENCY MEDICINE

## 2024-10-06 RX ORDER — SILVER SULFADIAZINE 10 MG/G
CREAM TOPICAL
Status: COMPLETED | OUTPATIENT
Start: 2024-10-06 | End: 2024-10-06

## 2024-10-06 RX ORDER — DOXYCYCLINE 100 MG/1
100 CAPSULE ORAL
Status: COMPLETED | OUTPATIENT
Start: 2024-10-06 | End: 2024-10-06

## 2024-10-06 RX ORDER — SILVER SULFADIAZINE 10 MG/G
CREAM TOPICAL
Qty: 400 G | Refills: 0 | Status: SHIPPED | OUTPATIENT
Start: 2024-10-06

## 2024-10-06 RX ORDER — DOXYCYCLINE HYCLATE 100 MG
100 TABLET ORAL 2 TIMES DAILY
Qty: 14 TABLET | Refills: 0 | Status: SHIPPED | OUTPATIENT
Start: 2024-10-06 | End: 2024-10-13

## 2024-10-06 RX ADMIN — SILVER SULFADIAZINE: 10 CREAM TOPICAL at 21:48

## 2024-10-06 RX ADMIN — DOXYCYCLINE HYCLATE 100 MG: 100 CAPSULE ORAL at 21:47

## 2024-10-06 ASSESSMENT — PAIN - FUNCTIONAL ASSESSMENT: PAIN_FUNCTIONAL_ASSESSMENT: 0-10

## 2024-10-06 ASSESSMENT — PAIN SCALES - GENERAL: PAINLEVEL_OUTOF10: 7

## 2024-10-07 ENCOUNTER — CARE COORDINATION (OUTPATIENT)
Dept: CARE COORDINATION | Facility: CLINIC | Age: 54
End: 2024-10-07

## 2024-10-07 NOTE — ED NOTES
Silvadene, nonstick, and bulky dressing applied to bilateral lower legs. Pt instructed on how to dress wounds at home.

## 2024-10-07 NOTE — CARE COORDINATION
Ambulatory Care Coordination Note     10/7/2024 9:26 AM     Patient Current Location:  [place]     This patient was received as a referral from Ambulatory Care Manager .    ACM contacted the patient by telephone. Verified name and  with patient as identifiers. Provided introduction to self, and explanation of the ACM role.   Spouse/Partner declined care management services at this time.

## 2024-10-07 NOTE — ED TRIAGE NOTES
Pt to ED with c/o wound to right lower leg. Pt states first noted earlier this week. Pt states hx of the same on left leg. Pt states just keeps getting bigger. Pt alert ambulatory and in no acute distress at this time.

## 2024-10-11 NOTE — ED PROVIDER NOTES
TRESIBA FLEXTOUCH 100 UNIT/ML SOPN ADMINISTER 26 UNITS UNDER THE SKIN EVERY MORNING, Disp-30 mL, R-3, DAWNormal      Continuous Glucose Sensor (DEXCOM G7 SENSOR) MISC COMPATIBLE with TANDEM PUMP, Change every 10 days, E10.65 dispense with underline below ID number on side of box, Disp-9 each, R-3, DAWNormal      Continuous Glucose  (DEXCOM G7 ) SHUN Use to monitor glucose, E11.65, Disp-1 each, R-0, DAWNormal      meloxicam (MOBIC) 15 MG tablet Take 0.5 tablets by mouth daily, Disp-15 tablet, R-0Print      mupirocin (BACTROBAN) 2 % ointment Apply topically 3 times daily., Disp-30 g, R-5, Normal      fluticasone (FLONASE) 50 MCG/ACT nasal spray 2 sprays by Each Nostril route daily, Disp-48 g, R-1Normal      acetone, urine, test strip Use if glucose >250 for 4 hours, if positive, proceed to ER E10.9, Disp-50 strip, R-1Normal      terbinafine (LAMISIL) 1 % cream Apply topically 2 times daily., Disp-15 g, R-1, Normal      diclofenac (VOLTAREN) 50 MG EC tablet Take 1 tablet by mouth 3 times daily (with meals), Disp-60 tablet, R-0Normal      ibuprofen (ADVIL;MOTRIN) 800 MG tablet Take 1 tablet by mouth every 8 hours as needed for Pain, Disp-21 tablet, R-0Print      COMFORT EZ PEN NEEDLES 31G X 8 MM MISC Starting Tue 1/24/2023, PAULA, Historical Med              No results found for any visits on 10/06/24.      No orders to display                No results for input(s): \"COVID19\" in the last 72 hours.    Voice dictation software was used during the making of this note.  This software is not perfect and grammatical and other typographical errors may be present.  This note has not been completely proofread for errors.        Tera Billy MD  10/11/24 0983

## 2024-10-17 ENCOUNTER — OFFICE VISIT (OUTPATIENT)
Dept: INTERNAL MEDICINE CLINIC | Facility: CLINIC | Age: 54
End: 2024-10-17

## 2024-10-17 VITALS
DIASTOLIC BLOOD PRESSURE: 72 MMHG | HEART RATE: 69 BPM | SYSTOLIC BLOOD PRESSURE: 132 MMHG | OXYGEN SATURATION: 96 % | BODY MASS INDEX: 26.97 KG/M2 | WEIGHT: 188.4 LBS | HEIGHT: 70 IN

## 2024-10-17 DIAGNOSIS — E10.69 TYPE 1 DIABETES MELLITUS WITH OTHER SPECIFIED COMPLICATION (HCC): ICD-10-CM

## 2024-10-17 DIAGNOSIS — I10 PRIMARY HYPERTENSION: ICD-10-CM

## 2024-10-17 DIAGNOSIS — Z23 ENCOUNTER FOR VACCINATION: ICD-10-CM

## 2024-10-17 DIAGNOSIS — L84 CORNS AND CALLOSITIES: ICD-10-CM

## 2024-10-17 DIAGNOSIS — Z12.11 SCREEN FOR COLON CANCER: ICD-10-CM

## 2024-10-17 DIAGNOSIS — E78.2 MIXED HYPERLIPIDEMIA: ICD-10-CM

## 2024-10-17 DIAGNOSIS — B35.1 ONYCHOMYCOSIS: ICD-10-CM

## 2024-10-17 DIAGNOSIS — Z00.00 MEDICARE ANNUAL WELLNESS VISIT, SUBSEQUENT: Primary | ICD-10-CM

## 2024-10-17 DIAGNOSIS — F41.9 ANXIETY: ICD-10-CM

## 2024-10-17 SDOH — HEALTH STABILITY: PHYSICAL HEALTH
ON AVERAGE, HOW MANY DAYS PER WEEK DO YOU ENGAGE IN MODERATE TO STRENUOUS EXERCISE (LIKE A BRISK WALK)?: PATIENT DECLINED

## 2024-10-17 ASSESSMENT — PATIENT HEALTH QUESTIONNAIRE - PHQ9
SUM OF ALL RESPONSES TO PHQ QUESTIONS 1-9: 0
2. FEELING DOWN, DEPRESSED OR HOPELESS: NOT AT ALL
7. TROUBLE CONCENTRATING ON THINGS, SUCH AS READING THE NEWSPAPER OR WATCHING TELEVISION: NOT AT ALL
4. FEELING TIRED OR HAVING LITTLE ENERGY: NOT AT ALL
SUM OF ALL RESPONSES TO PHQ QUESTIONS 1-9: 0
1. LITTLE INTEREST OR PLEASURE IN DOING THINGS: NOT AT ALL
SUM OF ALL RESPONSES TO PHQ QUESTIONS 1-9: 0
9. THOUGHTS THAT YOU WOULD BE BETTER OFF DEAD, OR OF HURTING YOURSELF: NOT AT ALL
3. TROUBLE FALLING OR STAYING ASLEEP: NOT AT ALL
8. MOVING OR SPEAKING SO SLOWLY THAT OTHER PEOPLE COULD HAVE NOTICED. OR THE OPPOSITE, BEING SO FIGETY OR RESTLESS THAT YOU HAVE BEEN MOVING AROUND A LOT MORE THAN USUAL: NOT AT ALL
SUM OF ALL RESPONSES TO PHQ QUESTIONS 1-9: 0
6. FEELING BAD ABOUT YOURSELF - OR THAT YOU ARE A FAILURE OR HAVE LET YOURSELF OR YOUR FAMILY DOWN: NOT AT ALL
SUM OF ALL RESPONSES TO PHQ9 QUESTIONS 1 & 2: 0
5. POOR APPETITE OR OVEREATING: NOT AT ALL
10. IF YOU CHECKED OFF ANY PROBLEMS, HOW DIFFICULT HAVE THESE PROBLEMS MADE IT FOR YOU TO DO YOUR WORK, TAKE CARE OF THINGS AT HOME, OR GET ALONG WITH OTHER PEOPLE: NOT DIFFICULT AT ALL

## 2024-10-17 ASSESSMENT — LIFESTYLE VARIABLES
HOW MANY STANDARD DRINKS CONTAINING ALCOHOL DO YOU HAVE ON A TYPICAL DAY: 0
HOW OFTEN DO YOU HAVE SIX OR MORE DRINKS ON ONE OCCASION: 1
HOW MANY STANDARD DRINKS CONTAINING ALCOHOL DO YOU HAVE ON A TYPICAL DAY: PATIENT DOES NOT DRINK
HOW OFTEN DO YOU HAVE A DRINK CONTAINING ALCOHOL: 1
HOW OFTEN DO YOU HAVE A DRINK CONTAINING ALCOHOL: NEVER

## 2024-10-17 NOTE — PROGRESS NOTES
Medicare Annual Wellness Visit    Bubba Ragland is here for Medicare AWV (Chronic condition f/u, review labs. )    Assessment & Plan   Medicare annual wellness visit, subsequent  Encounter for vaccination  -     Influenza, FLUCELVAX Trivalent, (age 6 mo+) IM, Preservative Free, 0.5mL  Screen for colon cancer  -     Cologuard (Fecal DNA Colorectal Cancer Screening)  Type 1 diabetes mellitus with other specified complication (HCC)  -     Hemoglobin A1C; Future  -     Comprehensive Metabolic Panel; Future  Mixed hyperlipidemia  -     Lipid Panel; Future  Corns and callosities  Onychomycosis      Advised to make follow up with Podiatry     Recommendations for Preventive Services Due: see orders and patient instructions/AVS.  Recommended screening schedule for the next 5-10 years is provided to the patient in written form: see Patient Instructions/AVS.     No follow-ups on file.     Subjective   The following acute and/or chronic problems were also addressed today:  For annual wellness visit, he is in the process of following with endocrinology next week, hemoglobin A1c continues to be in suboptimal control with A1c of 9.6, but has been making some progress, he continues to deal with some dietary indiscretions  Unfortunately he had wound when he was trying to remove some of the trees in his backyard, was seen in the emergency room, and has been doing wound care every day or every other day, he denies any signs of infection, and is feeling in good health,    He is currently  taking his medications as prescribed, denies any side effects, his anxiety has been well-controlled with current medications, he takes statins and denies any major issues, there is a slight increase in liver enzymes    He has not follow-up with podiatry over the last few months, he reports multiple changes in his feet, and toes nails    Patient's complete Health Risk Assessment and screening values have been reviewed and are found in

## 2024-10-22 ENCOUNTER — OFFICE VISIT (OUTPATIENT)
Dept: ENDOCRINOLOGY | Age: 54
End: 2024-10-22
Payer: MEDICARE

## 2024-10-22 VITALS
HEART RATE: 76 BPM | DIASTOLIC BLOOD PRESSURE: 70 MMHG | OXYGEN SATURATION: 97 % | BODY MASS INDEX: 28.3 KG/M2 | WEIGHT: 197.2 LBS | SYSTOLIC BLOOD PRESSURE: 112 MMHG

## 2024-10-22 DIAGNOSIS — F81.9 DELAY OF COGNITIVE DEVELOPMENT: ICD-10-CM

## 2024-10-22 DIAGNOSIS — Z86.39 HISTORY OF KETOACIDOSIS: ICD-10-CM

## 2024-10-22 DIAGNOSIS — E10.65 TYPE 1 DIABETES MELLITUS WITH HYPERGLYCEMIA (HCC): Primary | ICD-10-CM

## 2024-10-22 PROCEDURE — 3046F HEMOGLOBIN A1C LEVEL >9.0%: CPT | Performed by: PHYSICIAN ASSISTANT

## 2024-10-22 PROCEDURE — 2022F DILAT RTA XM EVC RTNOPTHY: CPT | Performed by: PHYSICIAN ASSISTANT

## 2024-10-22 PROCEDURE — G8419 CALC BMI OUT NRM PARAM NOF/U: HCPCS | Performed by: PHYSICIAN ASSISTANT

## 2024-10-22 PROCEDURE — 3078F DIAST BP <80 MM HG: CPT | Performed by: PHYSICIAN ASSISTANT

## 2024-10-22 PROCEDURE — 99214 OFFICE O/P EST MOD 30 MIN: CPT | Performed by: PHYSICIAN ASSISTANT

## 2024-10-22 PROCEDURE — 3017F COLORECTAL CA SCREEN DOC REV: CPT | Performed by: PHYSICIAN ASSISTANT

## 2024-10-22 PROCEDURE — 1036F TOBACCO NON-USER: CPT | Performed by: PHYSICIAN ASSISTANT

## 2024-10-22 PROCEDURE — 3074F SYST BP LT 130 MM HG: CPT | Performed by: PHYSICIAN ASSISTANT

## 2024-10-22 PROCEDURE — 95251 CONT GLUC MNTR ANALYSIS I&R: CPT | Performed by: PHYSICIAN ASSISTANT

## 2024-10-22 PROCEDURE — G8427 DOCREV CUR MEDS BY ELIG CLIN: HCPCS | Performed by: PHYSICIAN ASSISTANT

## 2024-10-22 PROCEDURE — G8484 FLU IMMUNIZE NO ADMIN: HCPCS | Performed by: PHYSICIAN ASSISTANT

## 2024-10-22 RX ORDER — INSULIN DEGLUDEC 100 U/ML
INJECTION, SOLUTION SUBCUTANEOUS
Qty: 30 ML | Refills: 3 | Status: SHIPPED | OUTPATIENT
Start: 2024-10-22

## 2024-10-22 RX ORDER — INSULIN LISPRO 100 [IU]/ML
INJECTION, SOLUTION INTRAVENOUS; SUBCUTANEOUS
Qty: 30 ML | Refills: 3 | Status: SHIPPED | OUTPATIENT
Start: 2024-10-22
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
## 2025-01-03 ENCOUNTER — APPOINTMENT (OUTPATIENT)
Dept: GENERAL RADIOLOGY | Age: 55
End: 2025-01-03
Payer: MEDICARE

## 2025-01-03 ENCOUNTER — HOSPITAL ENCOUNTER (EMERGENCY)
Age: 55
Discharge: HOME | End: 2025-01-03
Payer: MEDICARE

## 2025-01-03 VITALS
DIASTOLIC BLOOD PRESSURE: 80 MMHG | HEART RATE: 80 BPM | OXYGEN SATURATION: 100 % | RESPIRATION RATE: 18 BRPM | SYSTOLIC BLOOD PRESSURE: 154 MMHG | HEIGHT: 70 IN | BODY MASS INDEX: 27.2 KG/M2 | WEIGHT: 190 LBS | TEMPERATURE: 99 F

## 2025-01-03 DIAGNOSIS — J40 BRONCHITIS: Primary | ICD-10-CM

## 2025-01-03 LAB
FLUAV RNA SPEC QL NAA+PROBE: NOT DETECTED
FLUBV RNA SPEC QL NAA+PROBE: NOT DETECTED
SARS-COV-2 RDRP RESP QL NAA+PROBE: NOT DETECTED
SOURCE: NORMAL

## 2025-01-03 PROCEDURE — 6370000000 HC RX 637 (ALT 250 FOR IP): Performed by: PHYSICIAN ASSISTANT

## 2025-01-03 PROCEDURE — 71046 X-RAY EXAM CHEST 2 VIEWS: CPT

## 2025-01-03 PROCEDURE — 87502 INFLUENZA DNA AMP PROBE: CPT

## 2025-01-03 PROCEDURE — 99284 EMERGENCY DEPT VISIT MOD MDM: CPT

## 2025-01-03 PROCEDURE — 6360000002 HC RX W HCPCS: Performed by: PHYSICIAN ASSISTANT

## 2025-01-03 PROCEDURE — 96372 THER/PROPH/DIAG INJ SC/IM: CPT

## 2025-01-03 PROCEDURE — 87635 SARS-COV-2 COVID-19 AMP PRB: CPT

## 2025-01-03 RX ORDER — DEXAMETHASONE SODIUM PHOSPHATE 10 MG/ML
10 INJECTION INTRAMUSCULAR; INTRAVENOUS
Status: COMPLETED | OUTPATIENT
Start: 2025-01-03 | End: 2025-01-03

## 2025-01-03 RX ORDER — METHYLPREDNISOLONE 4 MG/1
TABLET ORAL
Qty: 1 KIT | Refills: 0 | Status: SHIPPED | OUTPATIENT
Start: 2025-01-03 | End: 2025-01-09

## 2025-01-03 RX ORDER — GUAIFENESIN 600 MG/1
1200 TABLET, EXTENDED RELEASE ORAL 2 TIMES DAILY
Qty: 40 TABLET | Refills: 0 | Status: SHIPPED | OUTPATIENT
Start: 2025-01-03 | End: 2025-01-13

## 2025-01-03 RX ORDER — ALBUTEROL SULFATE 90 UG/1
2 INHALANT RESPIRATORY (INHALATION) 4 TIMES DAILY PRN
Qty: 18 G | Refills: 5 | Status: SHIPPED | OUTPATIENT
Start: 2025-01-03

## 2025-01-03 RX ORDER — IPRATROPIUM BROMIDE AND ALBUTEROL SULFATE 2.5; .5 MG/3ML; MG/3ML
1 SOLUTION RESPIRATORY (INHALATION)
Status: COMPLETED | OUTPATIENT
Start: 2025-01-03 | End: 2025-01-03

## 2025-01-03 RX ORDER — BENZONATATE 200 MG/1
200 CAPSULE ORAL 3 TIMES DAILY PRN
Qty: 30 CAPSULE | Refills: 0 | Status: SHIPPED | OUTPATIENT
Start: 2025-01-03 | End: 2025-01-10

## 2025-01-03 RX ADMIN — IPRATROPIUM BROMIDE AND ALBUTEROL SULFATE 1 DOSE: 2.5; .5 SOLUTION RESPIRATORY (INHALATION) at 12:23

## 2025-01-03 RX ADMIN — DEXAMETHASONE SODIUM PHOSPHATE 10 MG: 10 INJECTION INTRAMUSCULAR; INTRAVENOUS at 12:24

## 2025-01-03 ASSESSMENT — PAIN - FUNCTIONAL ASSESSMENT: PAIN_FUNCTIONAL_ASSESSMENT: NONE - DENIES PAIN

## 2025-01-03 NOTE — ED TRIAGE NOTES
Patient states the other day he hate a salad and threw it up. Yet today he woke up with a cough.Patient states the cough is now making him want to vomit but only because of the cough.

## 2025-01-03 NOTE — ED PROVIDER NOTES
Emergency Department Provider Note       PCP: Radha Forbes MD   Age: 54 y.o.   Sex: male     DISPOSITION Decision To Discharge 01/03/2025 02:07:00 PM   DISPOSITION CONDITION Stable            ICD-10-CM    1. Bronchitis  J40           Medical Decision Making     Patient appears to have a viral infection today. His vital signs are stable, CXR is negative for acute process and exam is unremarkable. He was encouraged on symptomatic care, to drink plenty of fluids, rest, follow-up with his primary care physician and return to the emergency room if worsening. Use medication as directed, if OTC or prescription meds were given. All of his questions were answered. He is stable for discharge and ambulatory out of the ED at this time.       1 or more acute illnesses that pose a threat to life or bodily function.   Over the counter drug management performed.  Prescription drug management performed.  Shared medical decision making was utilized in creating the patients health plan today.  Considerations: The following items were considered but not ordered: further evaluation.    I independently ordered and reviewed each unique test.  I reviewed external records: ED visit note from a different ED.    The patients assessment required an independent historian: None.  The reason they were needed is .                History     Patient is here with rhinorrhea, congestion, body aches, cough and not feeling well for 2 days. He had some nausea and vomiting x 1 before it started. No chest pain, shortness of breath, abdominal pain, trouble with urination or bowel movements, dizziness, weakness, dyspnea on exertion, swelling/tingling or weakness to arms and legs. He ambulated to the room without difficulty and is well hydrated.      The history is provided by the patient.       ROS     Review of Systems     Physical Exam     Vitals signs and nursing note reviewed:  Vitals:    01/03/25 1140   BP: (!) 152/88   Pulse: 89

## 2025-01-03 NOTE — DISCHARGE INSTRUCTIONS
Drink plenty of fluids, rest, chest x-ray is showing a viral syndrome and no pneumonia.  Take the medication as directed.  Finish all of the prednisone, use the inhaler as directed and return to the ED if worsening in any way.  Follow-up with your PCP for recheck.

## 2025-01-06 ENCOUNTER — CARE COORDINATION (OUTPATIENT)
Dept: CARE COORDINATION | Facility: CLINIC | Age: 55
End: 2025-01-06

## 2025-01-06 NOTE — CARE COORDINATION
Ambulatory Care Coordination Note     2025 11:07 AM     Patient Current Location:  [place]     This patient was received as a referral from Ambulatory Care Manager .    ACM contacted the patient by telephone. Verified name and  with patient as identifiers. Provided introduction to self, and explanation of the ACM role.   Patient declined care management services at this time.

## 2025-01-20 DIAGNOSIS — E10.69 TYPE 1 DIABETES MELLITUS WITH OTHER SPECIFIED COMPLICATION (HCC): ICD-10-CM

## 2025-01-20 DIAGNOSIS — E78.2 MIXED HYPERLIPIDEMIA: ICD-10-CM

## 2025-01-20 LAB
ALBUMIN SERPL-MCNC: 3.4 G/DL (ref 3.5–5)
ALBUMIN/GLOB SERPL: 1.2 (ref 1–1.9)
ALP SERPL-CCNC: 105 U/L (ref 40–129)
ALT SERPL-CCNC: 37 U/L (ref 8–55)
ANION GAP SERPL CALC-SCNC: 8 MMOL/L (ref 7–16)
AST SERPL-CCNC: 21 U/L (ref 15–37)
BILIRUB SERPL-MCNC: 0.3 MG/DL (ref 0–1.2)
BUN SERPL-MCNC: 14 MG/DL (ref 6–23)
CALCIUM SERPL-MCNC: 9 MG/DL (ref 8.8–10.2)
CHLORIDE SERPL-SCNC: 99 MMOL/L (ref 98–107)
CHOLEST SERPL-MCNC: 118 MG/DL (ref 0–200)
CO2 SERPL-SCNC: 31 MMOL/L (ref 20–29)
CREAT SERPL-MCNC: 0.81 MG/DL (ref 0.8–1.3)
EST. AVERAGE GLUCOSE BLD GHB EST-MCNC: 235 MG/DL
GLOBULIN SER CALC-MCNC: 2.9 G/DL (ref 2.3–3.5)
GLUCOSE SERPL-MCNC: 246 MG/DL (ref 70–99)
HBA1C MFR BLD: 9.8 % (ref 0–5.6)
HDLC SERPL-MCNC: 30 MG/DL (ref 40–60)
HDLC SERPL: 3.9 (ref 0–5)
LDLC SERPL CALC-MCNC: 68 MG/DL (ref 0–100)
POTASSIUM SERPL-SCNC: 4.4 MMOL/L (ref 3.5–5.1)
PROT SERPL-MCNC: 6.3 G/DL (ref 6.3–8.2)
SODIUM SERPL-SCNC: 138 MMOL/L (ref 136–145)
TRIGL SERPL-MCNC: 102 MG/DL (ref 0–150)
VLDLC SERPL CALC-MCNC: 20 MG/DL (ref 6–23)

## 2025-01-26 SDOH — ECONOMIC STABILITY: FOOD INSECURITY: WITHIN THE PAST 12 MONTHS, THE FOOD YOU BOUGHT JUST DIDN'T LAST AND YOU DIDN'T HAVE MONEY TO GET MORE.: NEVER TRUE

## 2025-01-26 SDOH — ECONOMIC STABILITY: FOOD INSECURITY: WITHIN THE PAST 12 MONTHS, YOU WORRIED THAT YOUR FOOD WOULD RUN OUT BEFORE YOU GOT MONEY TO BUY MORE.: NEVER TRUE

## 2025-01-26 SDOH — ECONOMIC STABILITY: TRANSPORTATION INSECURITY
IN THE PAST 12 MONTHS, HAS THE LACK OF TRANSPORTATION KEPT YOU FROM MEDICAL APPOINTMENTS OR FROM GETTING MEDICATIONS?: NO

## 2025-01-26 SDOH — ECONOMIC STABILITY: INCOME INSECURITY: IN THE LAST 12 MONTHS, WAS THERE A TIME WHEN YOU WERE NOT ABLE TO PAY THE MORTGAGE OR RENT ON TIME?: NO

## 2025-01-26 SDOH — ECONOMIC STABILITY: TRANSPORTATION INSECURITY
IN THE PAST 12 MONTHS, HAS LACK OF TRANSPORTATION KEPT YOU FROM MEETINGS, WORK, OR FROM GETTING THINGS NEEDED FOR DAILY LIVING?: NO

## 2025-01-26 ASSESSMENT — PATIENT HEALTH QUESTIONNAIRE - PHQ9
SUM OF ALL RESPONSES TO PHQ QUESTIONS 1-9: 0
3. TROUBLE FALLING OR STAYING ASLEEP: NOT AT ALL
5. POOR APPETITE OR OVEREATING: NOT AT ALL
4. FEELING TIRED OR HAVING LITTLE ENERGY: NOT AT ALL
6. FEELING BAD ABOUT YOURSELF - OR THAT YOU ARE A FAILURE OR HAVE LET YOURSELF OR YOUR FAMILY DOWN: NOT AT ALL
2. FEELING DOWN, DEPRESSED OR HOPELESS: NOT AT ALL
9. THOUGHTS THAT YOU WOULD BE BETTER OFF DEAD, OR OF HURTING YOURSELF: NOT AT ALL
3. TROUBLE FALLING OR STAYING ASLEEP: NOT AT ALL
1. LITTLE INTEREST OR PLEASURE IN DOING THINGS: NOT AT ALL
SUM OF ALL RESPONSES TO PHQ QUESTIONS 1-9: 0
8. MOVING OR SPEAKING SO SLOWLY THAT OTHER PEOPLE COULD HAVE NOTICED. OR THE OPPOSITE - BEING SO FIDGETY OR RESTLESS THAT YOU HAVE BEEN MOVING AROUND A LOT MORE THAN USUAL: NOT AT ALL
6. FEELING BAD ABOUT YOURSELF - OR THAT YOU ARE A FAILURE OR HAVE LET YOURSELF OR YOUR FAMILY DOWN: NOT AT ALL
10. IF YOU CHECKED OFF ANY PROBLEMS, HOW DIFFICULT HAVE THESE PROBLEMS MADE IT FOR YOU TO DO YOUR WORK, TAKE CARE OF THINGS AT HOME, OR GET ALONG WITH OTHER PEOPLE: NOT DIFFICULT AT ALL
4. FEELING TIRED OR HAVING LITTLE ENERGY: NOT AT ALL
SUM OF ALL RESPONSES TO PHQ QUESTIONS 1-9: 0
7. TROUBLE CONCENTRATING ON THINGS, SUCH AS READING THE NEWSPAPER OR WATCHING TELEVISION: NOT AT ALL
10. IF YOU CHECKED OFF ANY PROBLEMS, HOW DIFFICULT HAVE THESE PROBLEMS MADE IT FOR YOU TO DO YOUR WORK, TAKE CARE OF THINGS AT HOME, OR GET ALONG WITH OTHER PEOPLE: NOT DIFFICULT AT ALL
7. TROUBLE CONCENTRATING ON THINGS, SUCH AS READING THE NEWSPAPER OR WATCHING TELEVISION: NOT AT ALL
SUM OF ALL RESPONSES TO PHQ QUESTIONS 1-9: 0
8. MOVING OR SPEAKING SO SLOWLY THAT OTHER PEOPLE COULD HAVE NOTICED. OR THE OPPOSITE, BEING SO FIGETY OR RESTLESS THAT YOU HAVE BEEN MOVING AROUND A LOT MORE THAN USUAL: NOT AT ALL
1. LITTLE INTEREST OR PLEASURE IN DOING THINGS: NOT AT ALL
5. POOR APPETITE OR OVEREATING: NOT AT ALL
2. FEELING DOWN, DEPRESSED OR HOPELESS: NOT AT ALL
9. THOUGHTS THAT YOU WOULD BE BETTER OFF DEAD, OR OF HURTING YOURSELF: NOT AT ALL
SUM OF ALL RESPONSES TO PHQ QUESTIONS 1-9: 0
SUM OF ALL RESPONSES TO PHQ9 QUESTIONS 1 & 2: 0

## 2025-01-29 ENCOUNTER — CARE COORDINATION (OUTPATIENT)
Dept: CARE COORDINATION | Facility: CLINIC | Age: 55
End: 2025-01-29

## 2025-01-29 ENCOUNTER — OFFICE VISIT (OUTPATIENT)
Dept: INTERNAL MEDICINE CLINIC | Facility: CLINIC | Age: 55
End: 2025-01-29

## 2025-01-29 VITALS
WEIGHT: 186 LBS | DIASTOLIC BLOOD PRESSURE: 56 MMHG | HEIGHT: 70 IN | OXYGEN SATURATION: 98 % | HEART RATE: 77 BPM | BODY MASS INDEX: 26.63 KG/M2 | SYSTOLIC BLOOD PRESSURE: 102 MMHG

## 2025-01-29 DIAGNOSIS — Z59.82 TRANSPORTATION INSECURITY: ICD-10-CM

## 2025-01-29 DIAGNOSIS — J30.2 SEASONAL ALLERGIES: ICD-10-CM

## 2025-01-29 DIAGNOSIS — F41.9 ANXIETY: ICD-10-CM

## 2025-01-29 DIAGNOSIS — E78.2 MIXED HYPERLIPIDEMIA: ICD-10-CM

## 2025-01-29 DIAGNOSIS — I10 PRIMARY HYPERTENSION: ICD-10-CM

## 2025-01-29 DIAGNOSIS — F32.89 OTHER DEPRESSION: ICD-10-CM

## 2025-01-29 DIAGNOSIS — M25.511 ACUTE PAIN OF RIGHT SHOULDER: Primary | ICD-10-CM

## 2025-01-29 DIAGNOSIS — E10.69 TYPE 1 DIABETES MELLITUS WITH OTHER SPECIFIED COMPLICATION (HCC): ICD-10-CM

## 2025-01-29 RX ORDER — NAPROXEN 500 MG/1
500 TABLET ORAL 2 TIMES DAILY PRN
Qty: 60 TABLET | Refills: 0 | Status: SHIPPED | OUTPATIENT
Start: 2025-01-29

## 2025-01-29 RX ORDER — LISINOPRIL 5 MG/1
5 TABLET ORAL DAILY
Qty: 90 TABLET | Refills: 1 | Status: SHIPPED | OUTPATIENT
Start: 2025-01-29

## 2025-01-29 RX ORDER — PRAVASTATIN SODIUM 20 MG
20 TABLET ORAL DAILY
Qty: 90 TABLET | Refills: 1 | Status: SHIPPED | OUTPATIENT
Start: 2025-01-29

## 2025-01-29 RX ORDER — CETIRIZINE HYDROCHLORIDE 10 MG/1
10 TABLET ORAL DAILY
Qty: 90 TABLET | Refills: 1 | Status: SHIPPED | OUTPATIENT
Start: 2025-01-29

## 2025-01-29 RX ORDER — CITALOPRAM HYDROBROMIDE 10 MG/1
10 TABLET ORAL DAILY
Qty: 90 TABLET | Refills: 1 | Status: SHIPPED | OUTPATIENT
Start: 2025-01-29

## 2025-01-29 SDOH — ECONOMIC STABILITY: FOOD INSECURITY: WITHIN THE PAST 12 MONTHS, YOU WORRIED THAT YOUR FOOD WOULD RUN OUT BEFORE YOU GOT MONEY TO BUY MORE.: NEVER TRUE

## 2025-01-29 SDOH — ECONOMIC STABILITY: FOOD INSECURITY: WITHIN THE PAST 12 MONTHS, THE FOOD YOU BOUGHT JUST DIDN'T LAST AND YOU DIDN'T HAVE MONEY TO GET MORE.: NEVER TRUE

## 2025-01-29 SDOH — ECONOMIC STABILITY - TRANSPORTATION SECURITY: TRANSPORTATION INSECURITY: Z59.82

## 2025-01-29 ASSESSMENT — ENCOUNTER SYMPTOMS
ABDOMINAL DISTENTION: 0
ABDOMINAL PAIN: 0

## 2025-01-29 NOTE — PROGRESS NOTES
Chief Complaint   Patient presents with    Follow-up     F/u chronic condition.         Bubba Ragland is a 54 y.o. male who presents today for Follow-up (F/u chronic condition. )     He is here for follow-up in chronic conditions, including hyperlipidemia, anxiety, hypertension, diabetes type 1,  He is requesting refills of his medications, he denies any side effects,  Labs were discussed with patient, A1c continues to be elevated, he had a follow-up with endocrine next week, reports some dietary indiscretions, has been using the insulin as prescribed, denies any hypoglycemia    Today he has concerns about right shoulder or arm or axilla pain, pain get worse when he moves his head, or lift his arm, this has been ongoing for the last week, this is different than the shoulder pain he had last year in his left shoulder, he denies any trauma, he reports has been trying some of the exercises that he learned during physical therapy    He continue to experience some changes in his nails, and, foot has not been able to see the podiatrist lately, having difficulties with transportation as his brother is who taking for appointments,    Recent ER visit noted for upper respiratory symptoms, not resolved, he was given Mucinex and Tessalon,        Wt Readings from Last 3 Encounters:   01/29/25 84.4 kg (186 lb)   01/03/25 86.2 kg (190 lb)   10/22/24 89.4 kg (197 lb 3.2 oz)     Vitals:    01/29/25 0919   BP: (!) 102/56   Site: Left Upper Arm   Position: Sitting   Pulse: 77   SpO2: 98%   Weight: 84.4 kg (186 lb)   Height: 1.778 m (5' 10\")        Assessment and plan:  1. Acute pain of right shoulder  -     naproxen (NAPROSYN) 500 MG tablet; Take 1 tablet by mouth 2 times daily as needed for Pain, Disp-60 tablet, R-0Normal  2. Seasonal allergies  -     cetirizine (ZYRTEC) 10 MG tablet; Take 1 tablet by mouth daily, Disp-90 tablet, R-1Normal  3. Other depression  -     citalopram (CELEXA) 10 MG tablet; Take 1 tablet by

## 2025-01-29 NOTE — CARE COORDINATION
JAE SAAVEDRA received referral from provider regarding pt transportation issues.  JAE SAAVEDRA called and spoke with pt this day.  Introduced self and role and reason for call.  Pt requested I speak with his brother, Jovani.  JAE SAAVEDRA called and left VM for Jovani requesting call back.  Will await return call and/or try again at a later time.  Will update as needed.

## 2025-02-05 ENCOUNTER — CARE COORDINATION (OUTPATIENT)
Dept: CARE COORDINATION | Facility: CLINIC | Age: 55
End: 2025-02-05

## 2025-02-05 ENCOUNTER — OFFICE VISIT (OUTPATIENT)
Dept: ENDOCRINOLOGY | Age: 55
End: 2025-02-05
Payer: MEDICARE

## 2025-02-05 VITALS
BODY MASS INDEX: 27.2 KG/M2 | OXYGEN SATURATION: 97 % | SYSTOLIC BLOOD PRESSURE: 122 MMHG | HEART RATE: 73 BPM | WEIGHT: 190 LBS | HEIGHT: 70 IN | DIASTOLIC BLOOD PRESSURE: 80 MMHG

## 2025-02-05 DIAGNOSIS — E10.65 TYPE 1 DIABETES MELLITUS WITH HYPERGLYCEMIA (HCC): Primary | ICD-10-CM

## 2025-02-05 DIAGNOSIS — Z86.39 HISTORY OF KETOACIDOSIS: ICD-10-CM

## 2025-02-05 DIAGNOSIS — F81.9 DELAY OF COGNITIVE DEVELOPMENT: ICD-10-CM

## 2025-02-05 PROCEDURE — 3017F COLORECTAL CA SCREEN DOC REV: CPT | Performed by: PHYSICIAN ASSISTANT

## 2025-02-05 PROCEDURE — 1036F TOBACCO NON-USER: CPT | Performed by: PHYSICIAN ASSISTANT

## 2025-02-05 PROCEDURE — 3074F SYST BP LT 130 MM HG: CPT | Performed by: PHYSICIAN ASSISTANT

## 2025-02-05 PROCEDURE — 95251 CONT GLUC MNTR ANALYSIS I&R: CPT | Performed by: PHYSICIAN ASSISTANT

## 2025-02-05 PROCEDURE — G8427 DOCREV CUR MEDS BY ELIG CLIN: HCPCS | Performed by: PHYSICIAN ASSISTANT

## 2025-02-05 PROCEDURE — 3046F HEMOGLOBIN A1C LEVEL >9.0%: CPT | Performed by: PHYSICIAN ASSISTANT

## 2025-02-05 PROCEDURE — 2022F DILAT RTA XM EVC RTNOPTHY: CPT | Performed by: PHYSICIAN ASSISTANT

## 2025-02-05 PROCEDURE — 3079F DIAST BP 80-89 MM HG: CPT | Performed by: PHYSICIAN ASSISTANT

## 2025-02-05 PROCEDURE — 99214 OFFICE O/P EST MOD 30 MIN: CPT | Performed by: PHYSICIAN ASSISTANT

## 2025-02-05 PROCEDURE — G8419 CALC BMI OUT NRM PARAM NOF/U: HCPCS | Performed by: PHYSICIAN ASSISTANT

## 2025-02-05 NOTE — PROGRESS NOTES
Right lower leg: No edema (stasis dermatitis).      Left lower leg: No edema (stasis dermatitis).   Feet:      Right foot:      Protective Sensation: 3 sites tested.  3 sites sensed.      Skin integrity: Callus and dry skin present.      Left foot:      Protective Sensation: 3 sites tested.  3 sites sensed.      Skin integrity: Callus and dry skin present.      Comments: Debris and signs of walking barefoot  Lymphadenopathy:      Cervical: No cervical adenopathy.   Skin:     General: Skin is warm and dry.      Comments: Bilateral LE  stasis dermatitis, dry flaking skin   Neurological:      General: No focal deficit present.      Mental Status: He is alert.   Psychiatric:         Mood and Affect: Mood normal.         Behavior: Behavior normal.         Thought Content: Thought content normal.         Judgment: Judgment normal.            Return in about 3 months (around 5/5/2025) for Type 1 Diabetes f/u.      Portions of this note were generated with the assistance of voice recogniton software.  As such, some errors in transcription may be present.

## 2025-02-05 NOTE — CARE COORDINATION
JAE SAAVEDRA continues to follow for SW Program.  JAE SAAVEDRA reached out to pt brother this day to follow up.  Unable to reach at this time.  VM left with return call details.  Will await return call and/or follow up one more time.

## 2025-02-12 ENCOUNTER — CARE COORDINATION (OUTPATIENT)
Dept: CARE COORDINATION | Facility: CLINIC | Age: 55
End: 2025-02-12

## 2025-02-12 NOTE — CARE COORDINATION
JAE CM to close program at this time.  Multiple attempts made to touch base with pt/pt brother with no success or return call.  Will reopen if return call made.

## 2025-03-10 RX ORDER — LANCING DEVICE
EACH MISCELLANEOUS
Qty: 500 EACH | Refills: 1 | Status: SHIPPED | OUTPATIENT
Start: 2025-03-10

## 2025-03-28 DIAGNOSIS — F32.89 OTHER DEPRESSION: ICD-10-CM

## 2025-03-28 DIAGNOSIS — R05.1 ACUTE COUGH: ICD-10-CM

## 2025-03-28 DIAGNOSIS — R09.81 SINUS CONGESTION: ICD-10-CM

## 2025-03-28 DIAGNOSIS — E78.2 MIXED HYPERLIPIDEMIA: ICD-10-CM

## 2025-03-28 DIAGNOSIS — F41.9 ANXIETY: ICD-10-CM

## 2025-03-28 DIAGNOSIS — I10 PRIMARY HYPERTENSION: ICD-10-CM

## 2025-03-28 DIAGNOSIS — J30.2 SEASONAL ALLERGIES: ICD-10-CM

## 2025-03-28 RX ORDER — PRAVASTATIN SODIUM 20 MG
20 TABLET ORAL DAILY
Qty: 90 TABLET | Refills: 1 | Status: SHIPPED | OUTPATIENT
Start: 2025-03-28

## 2025-03-28 RX ORDER — CITALOPRAM HYDROBROMIDE 10 MG/1
10 TABLET ORAL DAILY
Qty: 90 TABLET | Refills: 1 | Status: SHIPPED | OUTPATIENT
Start: 2025-03-28

## 2025-03-28 RX ORDER — LISINOPRIL 5 MG/1
5 TABLET ORAL DAILY
Qty: 90 TABLET | Refills: 1 | Status: SHIPPED | OUTPATIENT
Start: 2025-03-28

## 2025-03-28 RX ORDER — CETIRIZINE HYDROCHLORIDE 10 MG/1
10 TABLET ORAL DAILY
Qty: 90 TABLET | Refills: 1 | Status: SHIPPED | OUTPATIENT
Start: 2025-03-28

## 2025-03-28 RX ORDER — FLUTICASONE PROPIONATE 50 MCG
2 SPRAY, SUSPENSION (ML) NASAL DAILY
Qty: 48 G | Refills: 1 | Status: SHIPPED | OUTPATIENT
Start: 2025-03-28

## 2025-03-28 NOTE — TELEPHONE ENCOUNTER
Requested Prescriptions     Pending Prescriptions Disp Refills    cetirizine (ZYRTEC) 10 MG tablet 90 tablet 1     Sig: Take 1 tablet by mouth daily    citalopram (CELEXA) 10 MG tablet 90 tablet 1     Sig: Take 1 tablet by mouth daily    lisinopril (PRINIVIL;ZESTRIL) 5 MG tablet 90 tablet 1     Sig: Take 1 tablet by mouth daily    pravastatin (PRAVACHOL) 20 MG tablet 90 tablet 1     Sig: Take 1 tablet by mouth daily    Next ov 07/29/2025. Pharmacy confirmed.

## 2025-04-06 DIAGNOSIS — E10.65 TYPE 1 DIABETES MELLITUS WITH HYPERGLYCEMIA (HCC): ICD-10-CM

## 2025-04-07 RX ORDER — ACYCLOVIR 400 MG/1
TABLET ORAL
Qty: 1 EACH | Refills: 0 | Status: SHIPPED | OUTPATIENT
Start: 2025-04-07

## 2025-04-07 RX ORDER — ACYCLOVIR 400 MG/1
TABLET ORAL
Qty: 9 EACH | Refills: 3 | Status: SHIPPED | OUTPATIENT
Start: 2025-04-07

## 2025-05-06 ENCOUNTER — OFFICE VISIT (OUTPATIENT)
Dept: ENDOCRINOLOGY | Age: 55
End: 2025-05-06
Payer: MEDICARE

## 2025-05-06 VITALS
OXYGEN SATURATION: 97 % | DIASTOLIC BLOOD PRESSURE: 82 MMHG | SYSTOLIC BLOOD PRESSURE: 122 MMHG | WEIGHT: 188.8 LBS | HEIGHT: 70 IN | HEART RATE: 81 BPM | BODY MASS INDEX: 27.03 KG/M2

## 2025-05-06 DIAGNOSIS — Z86.39 HISTORY OF KETOACIDOSIS: ICD-10-CM

## 2025-05-06 DIAGNOSIS — E10.65 TYPE 1 DIABETES MELLITUS WITH HYPERGLYCEMIA (HCC): Primary | ICD-10-CM

## 2025-05-06 DIAGNOSIS — F81.9 DELAY OF COGNITIVE DEVELOPMENT: ICD-10-CM

## 2025-05-06 LAB — HBA1C MFR BLD: 9.4 %

## 2025-05-06 PROCEDURE — G8427 DOCREV CUR MEDS BY ELIG CLIN: HCPCS | Performed by: PHYSICIAN ASSISTANT

## 2025-05-06 PROCEDURE — 99214 OFFICE O/P EST MOD 30 MIN: CPT | Performed by: PHYSICIAN ASSISTANT

## 2025-05-06 PROCEDURE — 3079F DIAST BP 80-89 MM HG: CPT | Performed by: PHYSICIAN ASSISTANT

## 2025-05-06 PROCEDURE — 2022F DILAT RTA XM EVC RTNOPTHY: CPT | Performed by: PHYSICIAN ASSISTANT

## 2025-05-06 PROCEDURE — 3017F COLORECTAL CA SCREEN DOC REV: CPT | Performed by: PHYSICIAN ASSISTANT

## 2025-05-06 PROCEDURE — 95251 CONT GLUC MNTR ANALYSIS I&R: CPT | Performed by: PHYSICIAN ASSISTANT

## 2025-05-06 PROCEDURE — G8419 CALC BMI OUT NRM PARAM NOF/U: HCPCS | Performed by: PHYSICIAN ASSISTANT

## 2025-05-06 PROCEDURE — 3046F HEMOGLOBIN A1C LEVEL >9.0%: CPT | Performed by: PHYSICIAN ASSISTANT

## 2025-05-06 PROCEDURE — 3074F SYST BP LT 130 MM HG: CPT | Performed by: PHYSICIAN ASSISTANT

## 2025-05-06 PROCEDURE — 1036F TOBACCO NON-USER: CPT | Performed by: PHYSICIAN ASSISTANT

## 2025-05-06 PROCEDURE — 83036 HEMOGLOBIN GLYCOSYLATED A1C: CPT | Performed by: PHYSICIAN ASSISTANT

## 2025-05-06 RX ORDER — INSULIN DEGLUDEC 100 U/ML
INJECTION, SOLUTION SUBCUTANEOUS
Qty: 30 ML | Refills: 3 | Status: SHIPPED | OUTPATIENT
Start: 2025-05-06

## 2025-05-06 RX ORDER — INSULIN LISPRO 100 [IU]/ML
INJECTION, SOLUTION INTRAVENOUS; SUBCUTANEOUS
Qty: 30 ML | Refills: 3 | Status: SHIPPED | OUTPATIENT
Start: 2025-05-06

## 2025-05-06 NOTE — PROGRESS NOTES
[facility] ENDOCRINOLOGY   AND   THYROID NODULE CLINIC    Lillian Bernal PA-C  [facility] Endocrinology and Thyroid Nodule Clinic  [address], [address]  [place] [address]  Phone [phone]  Facsimile [phone]          Bubba Ragland is a 54 y.o. male seen 5/6/2025 for follow up evaluation of type 1 diabetes         Assessment and Plan:    Interpretation of 72 hour glucose monitor:  At least 72 hours of data were reviewed.  The patient utilizes a dexcom G6 continuous glucose monitoring system.  The average glucose during the reviewed timeframe was 263.  There is a pattern of constant postprandial hyperglycemia after supper.    Assessment & Plan          1. Type 1 diabetes mellitus with hyperglycemia (HCC)  A1c improved from 9.8 to 9.4.  Treatment plan: Maintain blood glucose 100-200. Provided high-protein food list. Administer 5 units Humalog before dinner/lunch, 6 units before supper. Increase Humalog by 1 unit if glucose >200. Vary injection sites. Refilled Tresiba.    Urged to avoid treating normal sugars as low, eat protein to prevent glucose from dropping    Change correct above from 150 up to 200, continue current 5 unites at lunch/dinner and increase to 6 units before supper  - AMB POC HEMOGLOBIN A1C  - HUMALOG KWIKPEN 100 UNIT/ML SOPN; 5 units AC dinner, 6 AC supper plus correction scale AC/HS 1/50>200 max daily dose 30 units  Dispense: 30 mL; Refill: 3  - TRESIBA FLEXTOUCH 100 UNIT/ML SOPN; ADMINISTER 24 UNITS UNDER THE SKIN EVERY MORNING  Dispense: 30 mL; Refill: 3  - HM DIABETES FOOT EXAM  - GLUCOSE MONITOR, 72 HOUR, PHYS INTERP      2. History of ketoacidosis  Importance of insulin compliance reviewed.  Has order for urine ketone test strips    3. Delay of cognitive development  One on one education may be helpful. This is likely a barrier to control    Bubba was seen today for follow-up.    Diagnoses and all orders for this visit:    Type 1 diabetes mellitus with

## 2025-07-21 SDOH — HEALTH STABILITY: PHYSICAL HEALTH: ON AVERAGE, HOW MANY DAYS PER WEEK DO YOU ENGAGE IN MODERATE TO STRENUOUS EXERCISE (LIKE A BRISK WALK)?: 0 DAYS

## 2025-07-21 SDOH — HEALTH STABILITY: PHYSICAL HEALTH: ON AVERAGE, HOW MANY MINUTES DO YOU ENGAGE IN EXERCISE AT THIS LEVEL?: 0 MIN

## 2025-07-21 ASSESSMENT — PATIENT HEALTH QUESTIONNAIRE - PHQ9
6. FEELING BAD ABOUT YOURSELF - OR THAT YOU ARE A FAILURE OR HAVE LET YOURSELF OR YOUR FAMILY DOWN: NOT AT ALL
SUM OF ALL RESPONSES TO PHQ QUESTIONS 1-9: 0
SUM OF ALL RESPONSES TO PHQ QUESTIONS 1-9: 0
8. MOVING OR SPEAKING SO SLOWLY THAT OTHER PEOPLE COULD HAVE NOTICED. OR THE OPPOSITE, BEING SO FIGETY OR RESTLESS THAT YOU HAVE BEEN MOVING AROUND A LOT MORE THAN USUAL: NOT AT ALL
2. FEELING DOWN, DEPRESSED OR HOPELESS: NOT AT ALL
SUM OF ALL RESPONSES TO PHQ QUESTIONS 1-9: 0
SUM OF ALL RESPONSES TO PHQ QUESTIONS 1-9: 0
1. LITTLE INTEREST OR PLEASURE IN DOING THINGS: NOT AT ALL
9. THOUGHTS THAT YOU WOULD BE BETTER OFF DEAD, OR OF HURTING YOURSELF: NOT AT ALL
7. TROUBLE CONCENTRATING ON THINGS, SUCH AS READING THE NEWSPAPER OR WATCHING TELEVISION: NOT AT ALL
10. IF YOU CHECKED OFF ANY PROBLEMS, HOW DIFFICULT HAVE THESE PROBLEMS MADE IT FOR YOU TO DO YOUR WORK, TAKE CARE OF THINGS AT HOME, OR GET ALONG WITH OTHER PEOPLE: NOT DIFFICULT AT ALL
3. TROUBLE FALLING OR STAYING ASLEEP: NOT AT ALL
5. POOR APPETITE OR OVEREATING: NOT AT ALL
4. FEELING TIRED OR HAVING LITTLE ENERGY: NOT AT ALL

## 2025-07-21 ASSESSMENT — LIFESTYLE VARIABLES
HOW OFTEN DO YOU HAVE SIX OR MORE DRINKS ON ONE OCCASION: 1
HOW OFTEN DO YOU HAVE A DRINK CONTAINING ALCOHOL: 1
HOW OFTEN DO YOU HAVE A DRINK CONTAINING ALCOHOL: NEVER
HOW MANY STANDARD DRINKS CONTAINING ALCOHOL DO YOU HAVE ON A TYPICAL DAY: PATIENT DOES NOT DRINK
HOW MANY STANDARD DRINKS CONTAINING ALCOHOL DO YOU HAVE ON A TYPICAL DAY: 0

## 2025-07-24 DIAGNOSIS — E10.69 TYPE 1 DIABETES MELLITUS WITH OTHER SPECIFIED COMPLICATION (HCC): ICD-10-CM

## 2025-07-24 LAB
ALBUMIN SERPL-MCNC: 3.6 G/DL (ref 3.5–5)
ALBUMIN/GLOB SERPL: 1 (ref 1–1.9)
ALP SERPL-CCNC: 127 U/L (ref 40–129)
ALT SERPL-CCNC: 70 U/L (ref 8–55)
ANION GAP SERPL CALC-SCNC: 9 MMOL/L (ref 7–16)
AST SERPL-CCNC: 39 U/L (ref 15–37)
BILIRUB SERPL-MCNC: 0.4 MG/DL (ref 0–1.2)
BUN SERPL-MCNC: 12 MG/DL (ref 6–23)
CALCIUM SERPL-MCNC: 9.7 MG/DL (ref 8.8–10.2)
CHLORIDE SERPL-SCNC: 98 MMOL/L (ref 98–107)
CO2 SERPL-SCNC: 31 MMOL/L (ref 20–29)
CREAT SERPL-MCNC: 0.82 MG/DL (ref 0.8–1.3)
EST. AVERAGE GLUCOSE BLD GHB EST-MCNC: 249 MG/DL
GLOBULIN SER CALC-MCNC: 3.4 G/DL (ref 2.3–3.5)
GLUCOSE SERPL-MCNC: 188 MG/DL (ref 70–99)
HBA1C MFR BLD: 10.3 % (ref 0–5.6)
POTASSIUM SERPL-SCNC: 5 MMOL/L (ref 3.5–5.1)
PROT SERPL-MCNC: 7 G/DL (ref 6.3–8.2)
SODIUM SERPL-SCNC: 137 MMOL/L (ref 136–145)

## 2025-07-29 ENCOUNTER — OFFICE VISIT (OUTPATIENT)
Dept: INTERNAL MEDICINE CLINIC | Facility: CLINIC | Age: 55
End: 2025-07-29
Payer: MEDICARE

## 2025-07-29 VITALS
HEART RATE: 64 BPM | OXYGEN SATURATION: 98 % | HEIGHT: 70 IN | DIASTOLIC BLOOD PRESSURE: 50 MMHG | BODY MASS INDEX: 27.06 KG/M2 | SYSTOLIC BLOOD PRESSURE: 98 MMHG | WEIGHT: 189 LBS

## 2025-07-29 DIAGNOSIS — F32.89 OTHER DEPRESSION: ICD-10-CM

## 2025-07-29 DIAGNOSIS — Z00.00 MEDICARE ANNUAL WELLNESS VISIT, SUBSEQUENT: Primary | ICD-10-CM

## 2025-07-29 DIAGNOSIS — J30.2 SEASONAL ALLERGIES: ICD-10-CM

## 2025-07-29 DIAGNOSIS — Z12.5 SCREENING FOR PROSTATE CANCER: ICD-10-CM

## 2025-07-29 DIAGNOSIS — F41.9 ANXIETY: ICD-10-CM

## 2025-07-29 DIAGNOSIS — E78.2 MIXED HYPERLIPIDEMIA: ICD-10-CM

## 2025-07-29 DIAGNOSIS — Z12.11 SCREEN FOR COLON CANCER: ICD-10-CM

## 2025-07-29 DIAGNOSIS — I10 PRIMARY HYPERTENSION: ICD-10-CM

## 2025-07-29 DIAGNOSIS — E10.69 TYPE 1 DIABETES MELLITUS WITH OTHER SPECIFIED COMPLICATION (HCC): ICD-10-CM

## 2025-07-29 PROCEDURE — G0446 INTENS BEHAVE THER CARDIO DX: HCPCS | Performed by: INTERNAL MEDICINE

## 2025-07-29 PROCEDURE — G8419 CALC BMI OUT NRM PARAM NOF/U: HCPCS | Performed by: INTERNAL MEDICINE

## 2025-07-29 PROCEDURE — 3017F COLORECTAL CA SCREEN DOC REV: CPT | Performed by: INTERNAL MEDICINE

## 2025-07-29 PROCEDURE — 3046F HEMOGLOBIN A1C LEVEL >9.0%: CPT | Performed by: INTERNAL MEDICINE

## 2025-07-29 PROCEDURE — 1036F TOBACCO NON-USER: CPT | Performed by: INTERNAL MEDICINE

## 2025-07-29 PROCEDURE — G8427 DOCREV CUR MEDS BY ELIG CLIN: HCPCS | Performed by: INTERNAL MEDICINE

## 2025-07-29 PROCEDURE — G0439 PPPS, SUBSEQ VISIT: HCPCS | Performed by: INTERNAL MEDICINE

## 2025-07-29 PROCEDURE — 3078F DIAST BP <80 MM HG: CPT | Performed by: INTERNAL MEDICINE

## 2025-07-29 PROCEDURE — 99214 OFFICE O/P EST MOD 30 MIN: CPT | Performed by: INTERNAL MEDICINE

## 2025-07-29 PROCEDURE — 2022F DILAT RTA XM EVC RTNOPTHY: CPT | Performed by: INTERNAL MEDICINE

## 2025-07-29 PROCEDURE — 3074F SYST BP LT 130 MM HG: CPT | Performed by: INTERNAL MEDICINE

## 2025-07-29 RX ORDER — PRAVASTATIN SODIUM 20 MG
20 TABLET ORAL DAILY
Qty: 90 TABLET | Refills: 1 | Status: SHIPPED | OUTPATIENT
Start: 2025-07-29

## 2025-07-29 RX ORDER — CETIRIZINE HYDROCHLORIDE 10 MG/1
10 TABLET ORAL DAILY
Qty: 90 TABLET | Refills: 1 | Status: SHIPPED | OUTPATIENT
Start: 2025-07-29

## 2025-07-29 RX ORDER — CITALOPRAM HYDROBROMIDE 10 MG/1
10 TABLET ORAL DAILY
Qty: 90 TABLET | Refills: 1 | Status: SHIPPED | OUTPATIENT
Start: 2025-07-29

## 2025-07-29 RX ORDER — LISINOPRIL 5 MG/1
5 TABLET ORAL DAILY
Qty: 90 TABLET | Refills: 1 | Status: SHIPPED | OUTPATIENT
Start: 2025-07-29

## 2025-07-29 ASSESSMENT — ENCOUNTER SYMPTOMS
ABDOMINAL DISTENTION: 0
ABDOMINAL PAIN: 0

## 2025-07-29 NOTE — PROGRESS NOTES
Chief Complaint   Patient presents with    Medicare AWV     F/u chronic conditions.         Bubba Ragland is a 55 y.o. male who presents today for Medicare AWV (F/u chronic conditions. )     He is here for annual wellness visit and follow-up of chronic conditions including diabetes, depression anxiety, hypertension, and hyperlipidemia,  He reports compliance with medical treatment, but he is not compliant with dietary changes, physical activity,  Reports dietary indiscretions, sometimes he eats sandwiches, etc. 1 he can eat 2, and also he likes to have bread with his meals,  He walks in the driveway, but he does not have a routine of exercise,  Reports using Tresiba 24 units in the morning and Humalog per insulin sliding scale,  He has been offered nutritional support, but he reports one of the main barriers is transportation, but he also would not like to do her virtual visit,    Compliant with pravastatin, lisinopril,  Has been using his Dexcom, reports hyperglycemia instead of hypoglycemia, lowest blood sugars has been around 160s    He feels citalopram has been helping with his anxiety and depression,  And his bilateral lower extremity edema and cellulitis has been stable, with no reactivation of ulcers,      He had some concerns about future insurance issues, he is concerned about losing his Medicare, Medicaid and how this can affect his health in the near future,    Wt Readings from Last 3 Encounters:   07/29/25 85.7 kg (189 lb)   05/06/25 85.6 kg (188 lb 12.8 oz)   02/05/25 86.2 kg (190 lb)     Vitals:    07/29/25 1416   BP: (!) 98/50   BP Site: Left Upper Arm   Patient Position: Sitting   Pulse: 64   SpO2: 98%   Weight: 85.7 kg (189 lb)   Height: 1.778 m (5' 10\")        Assessment and plan:  1. Medicare annual wellness visit, subsequent  2. Seasonal allergies  -     cetirizine (ZYRTEC) 10 MG tablet; Take 1 tablet by mouth daily, Disp-90 tablet, R-1Normal  3. Other depression  -     citalopram

## 2025-08-12 ENCOUNTER — OFFICE VISIT (OUTPATIENT)
Dept: ENDOCRINOLOGY | Age: 55
End: 2025-08-12
Payer: MEDICARE

## 2025-08-12 VITALS
HEART RATE: 70 BPM | DIASTOLIC BLOOD PRESSURE: 64 MMHG | SYSTOLIC BLOOD PRESSURE: 100 MMHG | BODY MASS INDEX: 27.77 KG/M2 | HEIGHT: 70 IN | WEIGHT: 194 LBS | OXYGEN SATURATION: 97 %

## 2025-08-12 DIAGNOSIS — I87.2 STASIS DERMATITIS OF BOTH LEGS: ICD-10-CM

## 2025-08-12 DIAGNOSIS — Z86.39 HISTORY OF KETOACIDOSIS: ICD-10-CM

## 2025-08-12 DIAGNOSIS — F81.9 DELAY OF COGNITIVE DEVELOPMENT: ICD-10-CM

## 2025-08-12 DIAGNOSIS — E10.65 TYPE 1 DIABETES MELLITUS WITH HYPERGLYCEMIA (HCC): Primary | ICD-10-CM

## 2025-08-12 PROCEDURE — 3046F HEMOGLOBIN A1C LEVEL >9.0%: CPT | Performed by: PHYSICIAN ASSISTANT

## 2025-08-12 PROCEDURE — 3078F DIAST BP <80 MM HG: CPT | Performed by: PHYSICIAN ASSISTANT

## 2025-08-12 PROCEDURE — G8419 CALC BMI OUT NRM PARAM NOF/U: HCPCS | Performed by: PHYSICIAN ASSISTANT

## 2025-08-12 PROCEDURE — 3074F SYST BP LT 130 MM HG: CPT | Performed by: PHYSICIAN ASSISTANT

## 2025-08-12 PROCEDURE — 2022F DILAT RTA XM EVC RTNOPTHY: CPT | Performed by: PHYSICIAN ASSISTANT

## 2025-08-12 PROCEDURE — 1036F TOBACCO NON-USER: CPT | Performed by: PHYSICIAN ASSISTANT

## 2025-08-12 PROCEDURE — 95251 CONT GLUC MNTR ANALYSIS I&R: CPT | Performed by: PHYSICIAN ASSISTANT

## 2025-08-12 PROCEDURE — G8427 DOCREV CUR MEDS BY ELIG CLIN: HCPCS | Performed by: PHYSICIAN ASSISTANT

## 2025-08-12 PROCEDURE — 99214 OFFICE O/P EST MOD 30 MIN: CPT | Performed by: PHYSICIAN ASSISTANT

## 2025-08-12 PROCEDURE — 3017F COLORECTAL CA SCREEN DOC REV: CPT | Performed by: PHYSICIAN ASSISTANT

## 2025-08-12 RX ORDER — INSULIN LISPRO 100 [IU]/ML
INJECTION, SOLUTION INTRAVENOUS; SUBCUTANEOUS
Qty: 30 ML | Refills: 3 | Status: SHIPPED | OUTPATIENT
Start: 2025-08-12

## 2025-08-12 RX ORDER — TRIAMCINOLONE ACETONIDE 5 MG/G
OINTMENT TOPICAL
Qty: 80 G | Refills: 1 | Status: SHIPPED | OUTPATIENT
Start: 2025-08-12 | End: 2025-08-19

## 2025-08-12 RX ORDER — INSULIN DEGLUDEC 100 U/ML
INJECTION, SOLUTION SUBCUTANEOUS
Qty: 30 ML | Refills: 3 | Status: SHIPPED | OUTPATIENT
Start: 2025-08-12